# Patient Record
Sex: FEMALE | Race: WHITE | Employment: OTHER | URBAN - METROPOLITAN AREA
[De-identification: names, ages, dates, MRNs, and addresses within clinical notes are randomized per-mention and may not be internally consistent; named-entity substitution may affect disease eponyms.]

---

## 2017-04-28 ENCOUNTER — TRANSFERRED RECORDS (OUTPATIENT)
Dept: HEALTH INFORMATION MANAGEMENT | Facility: CLINIC | Age: 72
End: 2017-04-28

## 2017-09-12 ENCOUNTER — TRANSFERRED RECORDS (OUTPATIENT)
Dept: HEALTH INFORMATION MANAGEMENT | Facility: CLINIC | Age: 72
End: 2017-09-12

## 2018-02-17 ENCOUNTER — TRANSFERRED RECORDS (OUTPATIENT)
Dept: HEALTH INFORMATION MANAGEMENT | Facility: CLINIC | Age: 73
End: 2018-02-17

## 2018-10-03 ENCOUNTER — TRANSFERRED RECORDS (OUTPATIENT)
Dept: HEALTH INFORMATION MANAGEMENT | Facility: CLINIC | Age: 73
End: 2018-10-03

## 2018-11-29 ENCOUNTER — TRANSFERRED RECORDS (OUTPATIENT)
Dept: HEALTH INFORMATION MANAGEMENT | Facility: CLINIC | Age: 73
End: 2018-11-29

## 2018-12-11 ENCOUNTER — HOSPITAL ENCOUNTER (OUTPATIENT)
Dept: CARDIOLOGY | Facility: CLINIC | Age: 73
Discharge: HOME | End: 2018-12-11
Attending: INTERNAL MEDICINE | Admitting: INTERNAL MEDICINE
Payer: MEDICARE

## 2018-12-11 DIAGNOSIS — I27.20 PULMONARY HYPERTENSION (H): ICD-10-CM

## 2018-12-11 DIAGNOSIS — J84.10 PULMONARY FIBROSIS (H): ICD-10-CM

## 2018-12-11 DIAGNOSIS — M35.9 SYSTEMIC INVOLVEMENT OF CONNECTIVE TISSUE (H): ICD-10-CM

## 2018-12-11 PROCEDURE — 25500064 ZZH RX 255 OP 636: Performed by: INTERNAL MEDICINE

## 2018-12-11 PROCEDURE — 93306 TTE W/DOPPLER COMPLETE: CPT

## 2018-12-11 PROCEDURE — 93306 TTE W/DOPPLER COMPLETE: CPT | Mod: 26 | Performed by: INTERNAL MEDICINE

## 2018-12-11 RX ADMIN — HUMAN ALBUMIN MICROSPHERES AND PERFLUTREN 3 ML: 10; .22 INJECTION, SOLUTION INTRAVENOUS at 09:15

## 2018-12-14 ENCOUNTER — TRANSFERRED RECORDS (OUTPATIENT)
Dept: HEALTH INFORMATION MANAGEMENT | Facility: CLINIC | Age: 73
End: 2018-12-14

## 2018-12-17 ENCOUNTER — TRANSFERRED RECORDS (OUTPATIENT)
Dept: HEALTH INFORMATION MANAGEMENT | Facility: CLINIC | Age: 73
End: 2018-12-17

## 2018-12-26 DIAGNOSIS — J84.9 ILD (INTERSTITIAL LUNG DISEASE) (H): Primary | ICD-10-CM

## 2019-02-27 ENCOUNTER — DOCUMENTATION ONLY (OUTPATIENT)
Dept: CARE COORDINATION | Facility: CLINIC | Age: 74
End: 2019-02-27

## 2019-04-16 PROCEDURE — 00000346 ZZHCL STATISTIC REVIEW OUTSIDE SLIDES TC 88321: Performed by: INTERNAL MEDICINE

## 2019-04-19 ENCOUNTER — OFFICE VISIT (OUTPATIENT)
Dept: PULMONOLOGY | Facility: CLINIC | Age: 74
End: 2019-04-19
Attending: INTERNAL MEDICINE
Payer: MEDICARE

## 2019-04-19 ENCOUNTER — ANCILLARY PROCEDURE (OUTPATIENT)
Dept: CT IMAGING | Facility: CLINIC | Age: 74
End: 2019-04-19
Attending: INTERNAL MEDICINE
Payer: MEDICARE

## 2019-04-19 ENCOUNTER — APPOINTMENT (OUTPATIENT)
Dept: LAB | Facility: CLINIC | Age: 74
End: 2019-04-19
Payer: MEDICARE

## 2019-04-19 VITALS
HEART RATE: 93 BPM | DIASTOLIC BLOOD PRESSURE: 80 MMHG | RESPIRATION RATE: 18 BRPM | BODY MASS INDEX: 22.57 KG/M2 | HEIGHT: 67 IN | WEIGHT: 143.8 LBS | SYSTOLIC BLOOD PRESSURE: 131 MMHG | OXYGEN SATURATION: 96 %

## 2019-04-19 DIAGNOSIS — J84.9 ILD (INTERSTITIAL LUNG DISEASE) (H): ICD-10-CM

## 2019-04-19 DIAGNOSIS — J84.9 ILD (INTERSTITIAL LUNG DISEASE) (H): Primary | ICD-10-CM

## 2019-04-19 LAB
6 MIN WALK (FT): 1095 FT
6 MIN WALK (M): 334 M

## 2019-04-19 PROCEDURE — 86606 ASPERGILLUS ANTIBODY: CPT | Performed by: INTERNAL MEDICINE

## 2019-04-19 PROCEDURE — 86331 IMMUNODIFFUSION OUCHTERLONY: CPT | Performed by: INTERNAL MEDICINE

## 2019-04-19 PROCEDURE — 36415 COLL VENOUS BLD VENIPUNCTURE: CPT | Performed by: INTERNAL MEDICINE

## 2019-04-19 PROCEDURE — G0463 HOSPITAL OUTPT CLINIC VISIT: HCPCS | Mod: ZF

## 2019-04-19 RX ORDER — METHYLPREDNISOLONE 4 MG
TABLET, DOSE PACK ORAL
Refills: 0 | COMMUNITY
Start: 2019-03-06 | End: 2019-09-06

## 2019-04-19 RX ORDER — PANTOPRAZOLE SODIUM 40 MG/1
TABLET, DELAYED RELEASE ORAL
COMMUNITY
Start: 2019-03-06

## 2019-04-19 RX ORDER — TRAZODONE HYDROCHLORIDE 50 MG/1
TABLET, FILM COATED ORAL
Refills: 2 | COMMUNITY
Start: 2019-02-25

## 2019-04-19 RX ORDER — MONTELUKAST SODIUM 10 MG/1
TABLET ORAL
Refills: 2 | COMMUNITY
Start: 2019-02-25

## 2019-04-19 RX ORDER — LORATADINE 10 MG/1
10 TABLET ORAL DAILY
COMMUNITY

## 2019-04-19 RX ORDER — FLUTICASONE PROPIONATE 50 MCG
SPRAY, SUSPENSION (ML) NASAL
Refills: 5 | COMMUNITY
Start: 2018-09-07 | End: 2020-04-17

## 2019-04-19 RX ORDER — NIFEDIPINE 30 MG/1
TABLET, EXTENDED RELEASE ORAL
Refills: 4 | COMMUNITY
Start: 2019-02-25

## 2019-04-19 RX ORDER — ALBUTEROL SULFATE 90 UG/1
AEROSOL, METERED RESPIRATORY (INHALATION)
Refills: 1 | COMMUNITY
Start: 2018-11-01

## 2019-04-19 RX ORDER — LEVOFLOXACIN 500 MG/1
TABLET, FILM COATED ORAL
Refills: 0 | COMMUNITY
Start: 2019-03-06 | End: 2019-09-06

## 2019-04-19 RX ORDER — ACETAMINOPHEN 160 MG/5ML
300 SUSPENSION, ORAL (FINAL DOSE FORM) ORAL 3 TIMES DAILY
COMMUNITY

## 2019-04-19 RX ORDER — DOCUSATE SODIUM 100 MG/1
100 CAPSULE, LIQUID FILLED ORAL 2 TIMES DAILY
COMMUNITY

## 2019-04-19 SDOH — HEALTH STABILITY: MENTAL HEALTH: HOW OFTEN DO YOU HAVE A DRINK CONTAINING ALCOHOL?: NEVER

## 2019-04-19 ASSESSMENT — MIFFLIN-ST. JEOR: SCORE: 1174.9

## 2019-04-19 ASSESSMENT — PAIN SCALES - GENERAL: PAINLEVEL: NO PAIN (0)

## 2019-04-19 NOTE — PATIENT INSTRUCTIONS
You were seen in interstitial lung disease clinic today for your lung disease. We would like to do blood work and discuss your case at our multidisciplinary conference on 4/22/19. Currently, our leading diagnosis is hypersensitivity pneumonitis vs idiopathic pulmonary fibrosis, but we would like to review all available tests to confirm. We will call you next week in regards to what our consensus is and what our recommendations are.

## 2019-04-19 NOTE — LETTER
4/19/2019       RE: Edda Sterling  [address]  [facility] [address]     Dear Colleague,    Thank you for referring your patient, Edda Sterling, to the [facility] at [facility]. Please see a copy of my visit note below.    Interstitial Lung Disease Clinic Note    A/P:  74F seen for one time visit for interstitial lung disease. Her 2010 lung biopsy in [place] showing nonnecrotizing granulomas is c/w hypersensitivity pneumonitis. Her HRCT today w/ peripheral predominant reticulations and honeycombing may represent chronic HP or idiopathic pulmonary fibrosis. Her diagnosis is not completely clear at this time. Leading diagnoses are chronic HP or IPF. Prior testing at [facility] was unrevealing for exposures, but she does have a history that is concerning. We will discuss her case at ILD conference 4/22/19 with hopes of coming up with a consensus diagnosis. We are also sending a HP panel today. If this is chronic HP, we likely will recommend prednisone with mycophenolate. If this is IPF, we would trial antifibrotic therapy (pirfenidone or nintedanib). In the meantime, she should continue Advair, which can help reduce inflammation in HP. We will call her following ILD conference to let her know our recommendations. In the future, she would like to continue to follow at [facility], which is reasonable.       I saw and evaluated patient with Fellow.  Case discussed - agree with note.  I reviewed PFT: mod restriction with mod diffusion defect.  I reviewed chest CT: peripheral and basilar-predominant fibrosis.  Radiology report indicates diffuse micronodules, which makes IPF unlikely and chronic HP more likely.  Will discuss in ILD conference.    SIVAN ARMAS M.D.        History:  74F seen as a new patient for ILD. She was diagnosed with ILD in 2010. She had an open lung bx showing chronic interstitial pneumonia w/ nonnecrotizing  "granulomas. She was given a diagnosis of HP, had unremarkable lab work-up. In 2011, she had a L pleural effusion and CT w/ UIP pattern. Found to have + ADAM and given diagnosis of possible CTD. She has been seen by rheumatology, most recently 11/2018. Ab w/u negative, ADAM neg. Told she does not appear to have CTD, no follow-up needed. She has previously followed w/ [facility] for ILD. Has not been on therapy, aside from Advair - unclear if this helps. Has TTE in the past c/w pHTN, RVSP 32.5.    Hospitalized in [place] this winter w/ difficulty swallowing. Diagnosed with pyloric stricture, which was dilated. Increased PPI, which helps.     Today, feels at baseline. Has NORTON w/ minimal activity, can go up 1 flight of stairs. No SOB at rest. Has daily cough, dry. Cough was worse this winter, which she relates to GERD. Has lost ~10-12lbs the past year, relates to aforementioned eating difficulties. Has allergy issues w/ sinus congestion, rhinorrhea, post-nasal gtt, unchanged for years. On Flonase, Singulair, Claritin. Has chronic joint pain in knees. Chronic dry eyes and dry mouth. Denies F/C, night sweats, dysphagia, CP, orthopnea, PND, LE edema, N/V/D, new joint aches or rashes.     10 point review of systems negative, aside from that mentioned in HPI.    /80   Pulse 93   Resp 18   Ht 1.702 m (5' 7\")   Wt 65.2 kg (143 lb 12.8 oz)   SpO2 96%   BMI 22.52 kg/m      Well-appearing, NAD  OP clear  RRR  Bibasilar crackles  Abd NTND  No edema    Labs:  Personally reviewed  ACE, C4, C3, CRP, ESR normal  Centromere, chromatin, dsDNA, RNP, Scl-70, Dangelo, SSA, SSB, ssDNA Ab neg    Imaging/Studies: Personally reviewed  CT Chest (4/19/19) -    FVC FEV1 TLC DLCO   4/2014 () 2.32L 76%      9/2015 () 2.35L 71%      4/2017 ([facility]) 2.3L 71% 1.8L 74%     9/2017 ([facility]) 2.3L 77% 1.9L 83%  11.9 56%   12/2018 ([facility]) 2.17L 68% 1.73L 72%  11.1 57%   4/2019 [facility] 2.00L 67% 1.72L 76% 3.58L 65% 12.81 60%     6MWT done today w/ reduced " distance w/o e/o hypoxia or desaturation w/o supplemental O2    History reviewed. No pertinent past medical history.  History reviewed. No pertinent surgical history.  History reviewed. No pertinent family history.  Social History     Socioeconomic History     Marital status:      Spouse name: Not on file     Number of children: Not on file     Years of education: Not on file     Highest education level: Not on file   Occupational History     Not on file   Social Needs     Financial resource strain: Not on file     Food insecurity:     Worry: Not on file     Inability: Not on file     Transportation needs:     Medical: Not on file     Non-medical: Not on file   Tobacco Use     Smoking status: Never Smoker     Smokeless tobacco: Never Used   Substance and Sexual Activity     Alcohol use: Not Currently     Frequency: Never     Drug use: Never     Sexual activity: Not on file   Lifestyle     Physical activity:     Days per week: Not on file     Minutes per session: Not on file     Stress: Not on file   Relationships     Social connections:     Talks on phone: Not on file     Gets together: Not on file     Attends Anabaptism service: Not on file     Active member of club or organization: Not on file     Attends meetings of clubs or organizations: Not on file     Relationship status: Not on file     Intimate partner violence:     Fear of current or ex partner: Not on file     Emotionally abused: Not on file     Physically abused: Not on file     Forced sexual activity: Not on file   Other Topics Concern     Parent/sibling w/ CABG, MI or angioplasty before 65F 55M? Not Asked   Social History Narrative     Not on file   Lives in [place]. Originally from [place], also lived in [place]. 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 Prior dogs, no current pets. No birds. No instruments. No other chemical/dust exposures. No hot tubs.  \[facility]\"" she has been exposed to mold frequently, including several floods in her office. Never smoker. No illicits.     Clark Arias MD  Pulmonary and Critical Care Medicine  [phone]      Interstitial Lung Disease Clinic Note    A/P:  74F seen for one time visit for interstitial lung disease. Her 2010 lung biopsy in [place] showing nonnecrotizing granulomas is c/w hypersensitivity pneumonitis. Her HRCT today w/ peripheral predominant reticulations and honeycombing may represent chronic HP or idiopathic pulmonary fibrosis. Her diagnosis is not completely clear at this time. Leading diagnoses are chronic HP or IPF. Prior testing at [facility] was unrevealing for exposures, but she does have a history that is concerning. We will discuss her case at ILD conference 4/22/19 with hopes of coming up with a consensus diagnosis. We are also sending a HP panel today. If this is chronic HP, we likely will recommend prednisone with mycophenolate. If this is IPF, we would trial antifibrotic therapy (pirfenidone or nintedanib). In the meantime, she should continue Advair, which can help reduce inflammation in HP. We will call her following ILD conference to let her know our recommendations. In the future, she would like to continue to follow at [facility], which is reasonable.       I saw and evaluated patient with Fellow.  Case discussed - agree with note.  I reviewed PFT: mod restriction with mod diffusion defect.  I reviewed chest CT: peripheral and basilar-predominant fibrosis.  Radiology report indicates diffuse microdules, which makes IPF unlikely and chronic HP more likely.  Will discuss in ILD conference.    Addendum:  I reviewed chest CT in ILD conference with Dr. Browning - Diffuse micronodules, basilar reticulation, possible honeycombing, traction bronchiectasis with minimal air trapping.  Not UIP due to micronodules.  I reivewed lung bx with Dr. Eckert - diffuse lymphocytic infiltrate, no honeycombing, a  few granulomas; looks like NSIP vs HP.  Will treat with prednisone and MMF.  Start prednisone 40 mg daily for 1 month, then 30 mg daily for 1 month, then 20 mg daily for 1 month, then 15 mg daily for 1 month, then 10 mg daily for 1 month, then 5 mg daily for 1 month, then stop.  Start  mg twice daily for 2 weeks, then 500 mg AM and 1000 mg PM for 2 weeks, then 1000 mg twice daily.  Check CBC with plt and dfif and CMP baseline before starting MMF, after 1 month, then every 3 months.  She is welcome to come back to clinic if needed. will follow up with Dr. Saunders.    SIVAN ARMAS M.D.        History:  74F seen as a new patient for ILD. She was diagnosed with ILD in 2010. She had an open lung bx showing chronic interstitial pneumonia w/ nonnecrotizing granulomas. She was given a diagnosis of HP, had unremarkable lab work-up. In 2011, she had a L pleural effusion and CT w/ UIP pattern. Found to have + ADAM and given diagnosis of possible CTD. She has been seen by rheumatology, most recently 11/2018. Ab w/u negative, ADAM neg. Told she does not appear to have CTD, no follow-up needed. She has previously followed w/ [facility] for ILD. Has not been on therapy, aside from Advair - unclear if this helps. Has TTE in the past c/w pHTN, RVSP 32.5.    Hospitalized in [place] this winter w/ difficulty swallowing. Diagnosed with pyloric stricture, which was dilated. Increased PPI, which helps.     Today, feels at baseline. Has NORTON w/ minimal activity, can go up 1 flight of stairs. No SOB at rest. Has daily cough, dry. Cough was worse this winter, which she relates to GERD. Has lost ~10-12lbs the past year, relates to aforementioned eating difficulties. Has allergy issues w/ sinus congestion, rhinorrhea, post-nasal gtt, unchanged for years. On Flonase, Singulair, Claritin. Has chronic joint pain in knees. Chronic dry eyes and dry mouth. Denies F/C, night sweats, dysphagia, CP, orthopnea, PND, LE edema, N/V/D, new joint aches or  "rashes.     10 point review of systems negative, aside from that mentioned in HPI.    /80   Pulse 93   Resp 18   Ht 1.702 m (5' 7\")   Wt 65.2 kg (143 lb 12.8 oz)   SpO2 96%   BMI 22.52 kg/m      Well-appearing, NAD  OP clear  RRR  Bibasilar crackles  Abd NTND  No edema    Labs:  Personally reviewed  ACE, C4, C3, CRP, ESR normal  Centromere, chromatin, dsDNA, RNP, Scl-70, Dangelo, SSA, SSB, ssDNA Ab neg    Imaging/Studies: Personally reviewed  CT Chest (4/19/19) -    FVC FEV1 TLC DLCO   4/2014 () 2.32L 76%      9/2015 () 2.35L 71%      4/2017 () 2.3L 71% 1.8L 74%     9/2017 () 2.3L 77% 1.9L 83%  11.9 56%   12/2018 ([facility]) 2.17L 68% 1.73L 72%  11.1 57%   4/2019 [facility] 2.00L 67% 1.72L 76% 3.58L 65% 12.81 60%     6MWT done today w/ reduced distance w/o e/o hypoxia or desaturation w/o supplemental O2    History reviewed. No pertinent past medical history.  History reviewed. No pertinent surgical history.  History reviewed. No pertinent family history.  Social History     Socioeconomic History     Marital status:      Spouse name: Not on file     Number of children: Not on file     Years of education: Not on file     Highest education level: Not on file   Occupational History     Not on file   Social Needs     Financial resource strain: Not on file     Food insecurity:     Worry: Not on file     Inability: Not on file     Transportation needs:     Medical: Not on file     Non-medical: Not on file   Tobacco Use     Smoking status: Never Smoker     Smokeless tobacco: Never Used   Substance and Sexual Activity     Alcohol use: Not Currently     Frequency: Never     Drug use: Never     Sexual activity: Not on file   Lifestyle     Physical activity:     Days per week: Not on file     Minutes per session: Not on file     Stress: Not on file   Relationships     Social connections:     Talks on phone: Not on file     Gets together: Not on file     Attends Denominational service: Not on file     Active member of " club or organization: Not on file     Attends meetings of clubs or organizations: Not on file     Relationship status: Not on file     Intimate partner violence:     Fear of current or ex partner: Not on file     Emotionally abused: Not on file     Physically abused: Not on file     Forced sexual activity: Not on file   Other Topics Concern     Parent/sibling w/ CABG, MI or angioplasty before 65F 55M? Not Asked   Social History Narrative     Not on file   Lives in [place]. Originally from [place], also lived in [place]. 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 Prior dogs, no current pets. No birds. No instruments. No other chemical/dust exposures. No hot tubs. States she has been exposed to mold frequently, including several floods in her office. Never smoker. No illicits.     Clark Arias MD  Pulmonary and Critical Care Medicine  [phone]      Again, thank you for allowing me to participate in the care of your patient.      Sincerely,    Kitty Olivares MD

## 2019-04-19 NOTE — PROGRESS NOTES
Interstitial Lung Disease Clinic Note    A/P:  74F seen for one time visit for interstitial lung disease. Her 2010 lung biopsy in [place] showing nonnecrotizing granulomas is c/w hypersensitivity pneumonitis. Her HRCT today w/ peripheral predominant reticulations and honeycombing may represent chronic HP or idiopathic pulmonary fibrosis. Her diagnosis is not completely clear at this time. Leading diagnoses are chronic HP or IPF. Prior testing at [facility] was unrevealing for exposures, but she does have a history that is concerning. We will discuss her case at ILD conference 4/22/19 with hopes of coming up with a consensus diagnosis. We are also sending a HP panel today. If this is chronic HP, we likely will recommend prednisone with mycophenolate. If this is IPF, we would trial antifibrotic therapy (pirfenidone or nintedanib). In the meantime, she should continue Advair, which can help reduce inflammation in HP. We will call her following ILD conference to let her know our recommendations. In the future, she would like to continue to follow at [facility], which is reasonable.       I saw and evaluated patient with Fellow.  Case discussed - agree with note.  I reviewed PFT: mod restriction with mod diffusion defect.  I reviewed chest CT: peripheral and basilar-predominant fibrosis.  Radiology report indicates diffuse microdules, which makes IPF unlikely and chronic HP more likely.  Will discuss in ILD conference.    Addendum:  I reviewed chest CT in ILD conference with Dr. Browning - Diffuse micronodules, basilar reticulation, possible honeycombing, traction bronchiectasis with minimal air trapping.  Not UIP due to micronodules.  I reivewed lung bx with Dr. Eckert - diffuse lymphocytic infiltrate, no honeycombing, a few granulomas; looks like NSIP vs HP.  Will treat with prednisone and MMF.  Start prednisone 40 mg daily for 1 month, then 30 mg daily for 1 month, then 20 mg daily for 1 month, then 15 mg  "daily for 1 month, then 10 mg daily for 1 month, then 5 mg daily for 1 month, then stop.  Start  mg twice daily for 2 weeks, then 500 mg AM and 1000 mg PM for 2 weeks, then 1000 mg twice daily.  Check CBC with plt and dfif and CMP baseline before starting MMF, after 1 month, then every 3 months.  She is welcome to come back to clinic if needed. will follow up with Dr. Saunders.    SIVAN ARMAS M.D.        History:  74F seen as a new patient for ILD. She was diagnosed with ILD in 2010. She had an open lung bx showing chronic interstitial pneumonia w/ nonnecrotizing granulomas. She was given a diagnosis of HP, had unremarkable lab work-up. In 2011, she had a L pleural effusion and CT w/ UIP pattern. Found to have + ADAM and given diagnosis of possible CTD. She has been seen by rheumatology, most recently 11/2018. Ab w/u negative, ADAM neg. Told she does not appear to have CTD, no follow-up needed. She has previously followed w/ [facility] for ILD. Has not been on therapy, aside from Advair - unclear if this helps. Has TTE in the past c/w pHTN, RVSP 32.5.    Hospitalized in [place] this winter w/ difficulty swallowing. Diagnosed with pyloric stricture, which was dilated. Increased PPI, which helps.     Today, feels at baseline. Has NORTON w/ minimal activity, can go up 1 flight of stairs. No SOB at rest. Has daily cough, dry. Cough was worse this winter, which she relates to GERD. Has lost ~10-12lbs the past year, relates to aforementioned eating difficulties. Has allergy issues w/ sinus congestion, rhinorrhea, post-nasal gtt, unchanged for years. On Flonase, Singulair, Claritin. Has chronic joint pain in knees. Chronic dry eyes and dry mouth. Denies F/C, night sweats, dysphagia, CP, orthopnea, PND, LE edema, N/V/D, new joint aches or rashes.     10 point review of systems negative, aside from that mentioned in HPI.    /80   Pulse 93   Resp 18   Ht 1.702 m (5' 7\")   Wt 65.2 kg (143 lb 12.8 oz)   SpO2 96%   BMI " 22.52 kg/m     Well-appearing, NAD  HEENT: OP clear  Cardiac: RRR  Lungs: Bibasilar crackles  Abd NTND  Musculoskeletal: No edema  Skin: no rash on limited exam  Psych: normal mood and affect.  Neuro: normal gait.      Labs:  Personally reviewed  ACE, C4, C3, CRP, ESR normal  Centromere, chromatin, dsDNA, RNP, Scl-70, Dangelo, SSA, SSB, ssDNA Ab neg    Imaging/Studies: Personally reviewed  CT Chest (4/19/19) -    FVC FEV1 TLC DLCO   4/2014 () 2.32L 76%      9/2015 ([facility]) 2.35L 71%      4/2017 ([facility]) 2.3L 71% 1.8L 74%     9/2017 ([facility]) 2.3L 77% 1.9L 83%  11.9 56%   12/2018 ([facility]) 2.17L 68% 1.73L 72%  11.1 57%   4/2019 [facility] 2.00L 67% 1.72L 76% 3.58L 65% 12.81 60%     6MWT done today w/ reduced distance w/o e/o hypoxia or desaturation w/o supplemental O2    History reviewed. No pertinent past medical history.  History reviewed. No pertinent surgical history.  History reviewed. No pertinent family history.  Social History     Socioeconomic History     Marital status:      Spouse name: Not on file     Number of children: Not on file     Years of education: Not on file     Highest education level: Not on file   Occupational History     Not on file   Social Needs     Financial resource strain: Not on file     Food insecurity:     Worry: Not on file     Inability: Not on file     Transportation needs:     Medical: Not on file     Non-medical: Not on file   Tobacco Use     Smoking status: Never Smoker     Smokeless tobacco: Never Used   Substance and Sexual Activity     Alcohol use: Not Currently     Frequency: Never     Drug use: Never     Sexual activity: Not on file   Lifestyle     Physical activity:     Days per week: Not on file     Minutes per session: Not on file     Stress: Not on file   Relationships     Social connections:     Talks on phone: Not on file     Gets together: Not on file     Attends Hinduism service: Not on file     Active member of club or organization: Not on file     Attends meetings of clubs  or organizations: Not on file     Relationship status: Not on file     Intimate partner violence:     Fear of current or ex partner: Not on file     Emotionally abused: Not on file     Physically abused: Not on file     Forced sexual activity: Not on file   Other Topics Concern     Parent/sibling w/ CABG, MI or angioplasty before 65F 55M? Not Asked   Social History Narrative     Not on file   Lives in [place]. Originally from [place], also lived in [place]. 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 Prior dogs, no current pets. No birds. No instruments. No other chemical/dust exposures. No hot tubs. States she has been exposed to mold frequently, including several floods in her office. Never smoker. No illicits.     Clark Arias MD  Pulmonary and Critical Care Medicine  [phone]

## 2019-04-22 LAB
COPATH REPORT: NORMAL
DLCOUNC-%PRED-PRE: 60 %
DLCOUNC-PRE: 12.81 ML/MIN/MMHG
DLCOUNC-PRED: 21.04 ML/MIN/MMHG
ERV-%PRED-PRE: 88 %
ERV-PRE: 0.75 L
ERV-PRED: 0.85 L
EXPTIME-PRE: 5.31 SEC
FEF2575-%PRED-PRE: 110 %
FEF2575-PRE: 1.97 L/SEC
FEF2575-PRED: 1.78 L/SEC
FEFMAX-%PRED-PRE: 104 %
FEFMAX-PRE: 5.87 L/SEC
FEFMAX-PRED: 5.63 L/SEC
FEV1-%PRED-PRE: 76 %
FEV1-PRE: 1.72 L
FEV1FEV6-PRE: 86 %
FEV1FEV6-PRED: 78 %
FEV1FVC-PRE: 86 %
FEV1FVC-PRED: 77 %
FEV1SVC-PRE: 94 %
FEV1SVC-PRED: 68 %
FIFMAX-PRE: 4.33 L/SEC
FRCPLETH-%PRED-PRE: 86 %
FRCPLETH-PRE: 2.49 L
FRCPLETH-PRED: 2.89 L
FVC-%PRED-PRE: 67 %
FVC-PRE: 2 L
FVC-PRED: 2.95 L
IC-%PRED-PRE: 43 %
IC-PRE: 1.09 L
IC-PRED: 2.47 L
RVPLETH-%PRED-PRE: 75 %
RVPLETH-PRE: 1.74 L
RVPLETH-PRED: 2.3 L
TLCPLETH-%PRED-PRE: 65 %
TLCPLETH-PRE: 3.58 L
TLCPLETH-PRED: 5.44 L
VA-%PRED-PRE: 59 %
VA-PRE: 3.14 L
VC-%PRED-PRE: 55 %
VC-PRE: 1.84 L
VC-PRED: 3.32 L

## 2019-04-23 LAB
A FLAVUS AB SER QL ID: NORMAL
A FUMIGATUS1 AB SER QL ID: NORMAL
A FUMIGATUS2 AB SER QL: NORMAL
A FUMIGATUS3 AB SER QL ID: NORMAL
A FUMIGATUS6 AB SER QL ID: NORMAL
A PULLULANS AB SER QL ID: NORMAL
LACEYELLA SACCHARI AB SER QL: NORMAL
PIGEON DROP IGE QN: NORMAL
S RECTIVIRGULA AB SER QL ID: NORMAL
S VIRIDIS AB SER QL: NORMAL
T CANDIDUS AB SER QL: NORMAL
T VULGARIS AB SER QL ID: NORMAL

## 2019-04-26 ENCOUNTER — TELEPHONE (OUTPATIENT)
Dept: PULMONOLOGY | Facility: CLINIC | Age: 74
End: 2019-04-26

## 2019-04-26 NOTE — TELEPHONE ENCOUNTER
----- Message from Kitty Olivares MD sent at 4/25/2019 11:48 AM CDT -----  Regarding: follow up  Please call her with recommendations: If she decides to follow with us, that's fine since the medications are new and require monitoring.    See my addendum below.  Thanks.      Addendum:  I reviewed chest CT in ILD conference with Dr. Browning - Diffuse micronodules, basilar reticulation, possible honeycombing, traction bronchiectasis with minimal air trapping.  Not UIP due to micronodules.  I reivewed lung bx with Dr. Eckert - diffuse lymphocytic infiltrate, no honeycombing, a few granulomas; looks like NSIP vs HP.  Will treat with prednisone and MMF, which treats both NSIP or HP.  Start prednisone 40 mg daily for 1 month, then 30 mg daily for 1 month, then 20 mg daily for 1 month, then 15 mg daily for 1 month, then 10 mg daily for 1 month, then 5 mg daily for 1 month, then stop.  Start  mg twice daily for 2 weeks, then 500 mg AM and 1000 mg PM for 2 weeks, then 1000 mg twice daily.  Check CBC with plt and dfif and CMP baseline before starting MMF, after 1 month, then every 3 months.  She is welcome to come back to clinic if needed. will follow up with Dr. Saunders.

## 2019-04-26 NOTE — TELEPHONE ENCOUNTER
Spoke to patient, informed her of ILD discussion and Dr Olivares recommendation on plan. Patient agreed with plan, wants to continue with Dr Saunders, Dr Olivares's note sent to his office and spoke with nurse Wilkins, they will contact the patient.

## 2019-07-31 DIAGNOSIS — J84.9 ILD (INTERSTITIAL LUNG DISEASE) (H): Primary | ICD-10-CM

## 2019-09-06 ENCOUNTER — PATIENT OUTREACH (OUTPATIENT)
Dept: PULMONOLOGY | Facility: CLINIC | Age: 74
End: 2019-09-06

## 2019-09-06 ENCOUNTER — OFFICE VISIT (OUTPATIENT)
Dept: PULMONOLOGY | Facility: CLINIC | Age: 74
End: 2019-09-06
Attending: INTERNAL MEDICINE
Payer: MEDICARE

## 2019-09-06 VITALS
DIASTOLIC BLOOD PRESSURE: 77 MMHG | OXYGEN SATURATION: 95 % | RESPIRATION RATE: 17 BRPM | SYSTOLIC BLOOD PRESSURE: 148 MMHG | BODY MASS INDEX: 21.97 KG/M2 | WEIGHT: 140 LBS | HEART RATE: 78 BPM | HEIGHT: 67 IN

## 2019-09-06 DIAGNOSIS — J84.9 ILD (INTERSTITIAL LUNG DISEASE) (H): ICD-10-CM

## 2019-09-06 PROCEDURE — G0463 HOSPITAL OUTPT CLINIC VISIT: HCPCS | Mod: ZF

## 2019-09-06 RX ORDER — MYCOPHENOLATE MOFETIL 500 MG/1
TABLET ORAL
Qty: 120 TABLET | Refills: 11 | Status: SHIPPED | OUTPATIENT
Start: 2019-09-06 | End: 2019-10-14

## 2019-09-06 RX ORDER — ATOVAQUONE 750 MG/5ML
1500 SUSPENSION ORAL DAILY
Qty: 300 ML | Refills: 11 | Status: SHIPPED | OUTPATIENT
Start: 2019-09-06 | End: 2019-10-14

## 2019-09-06 RX ORDER — PREDNISONE 10 MG/1
TABLET ORAL
Qty: 100 TABLET | Refills: 0 | Status: SHIPPED | OUTPATIENT
Start: 2019-09-06 | End: 2020-01-16

## 2019-09-06 ASSESSMENT — PAIN SCALES - GENERAL: PAINLEVEL: NO PAIN (0)

## 2019-09-06 ASSESSMENT — MIFFLIN-ST. JEOR: SCORE: 1157.67

## 2019-09-06 NOTE — LETTER
9/6/2019       RE: Edda Sterling  [address]  [facility] [address]     Dear Colleague,    Thank you for referring your patient, Edda Sterling, to the [facility] at [facility]. Please see a copy of my visit note below.    [facility]    Reason for Visit  Edda Sterling is a 74 year old year old female who is being seen for RECHECK (NSIP)    HPI    Ms. Sterling is a 74-year-old with interstitial lung disease who is here for follow-up. She has had ILD since at least 2010 and was diagnosed with hypersensitivity pneumonitis in [place].  I first saw her 4 months ago, and review at ILD multidisciplinary conference was that her chest CT scan and prior lung biopsy were consistent with chronic hypersensitivity pneumonitis.  For convenience sake, she wanted to follow-up with Dr. Saunders at [facility].  However, he recommended that she follow-up here to initiate and follow her medications for treatment of chronic HP.  She had a positive ADAM in the past in [place], however most recent ADAM in 2018 was negative, and Dr. Bernal in rheumatology felt she did not have a connective tissue disease.    She has had 4 endoscopies this year for pyloric stricture with attempts to dilate.  She takes Protonix daily for GERD.  She does not eat bedtime snacks.  She endorses dyspnea on exertion, for example when she walks up a flight of stairs or walks uphill.  She thinks it is unchanged over the past year.  She denies paroxysmal nocturnal dyspnea, new skin rashes, or new joint pains.  She does endorse sicca symptoms which she has had for many years.  She also endorses occasional cough.  She does not exercise.  At most, she walks for maybe 20 minutes.  She did pulmonary rehab in [place] 4 to 5 years ago.  She lives in [place] and coming to [place] is inconvenient, and she would prefer to follow-up somewhere  closer to her home if possible.        Current Outpatient Medications   Medication     ADVAIR DISKUS 250-50 MCG/DOSE inhaler     Alum & Mag Hydroxide-Simeth (MYLANTA PO)     atovaquone (MEPRON) 750 MG/5ML suspension     cholecalciferol (VITAMIN D3) 5000 units TABS tablet     docusate sodium (COLACE) 100 MG capsule     fluticasone (FLONASE) 50 MCG/ACT nasal spray     loratadine (CLARITIN) 10 MG tablet     montelukast (SINGULAIR) 10 MG tablet     mycophenolate (GENERIC EQUIVALENT) 500 MG tablet     Naproxen Sodium (ALEVE PO)     NIFEdipine ER OSMOTIC (PROCARDIA XL) 30 MG 24 hr tablet     pantoprazole (PROTONIX) 40 MG EC tablet     predniSONE (DELTASONE) 10 MG tablet     PROAIR  (90 Base) MCG/ACT inhaler     st johns wort 300 MG TABS tablet     traZODone (DESYREL) 50 MG tablet     No current facility-administered medications for this visit.      Allergies   Allergen Reactions     Nuts      Peas      Seasonal Allergies      Sulfa Drugs      Past Medical History:   Diagnosis Date     ILD (interstitial lung disease) (H)     Surgical lung bx 2010 poss HP, + ADAM 2011, but repeat neg and no CTD per Rheum Dr. Bernal 2018; ILD clinic 4/2019: CT and lung bx consistent with chronic HP       No past surgical history on file.    Social History     Socioeconomic History     Marital status:      Spouse name: Not on file     Number of children: Not on file     Years of education: Not on file     Highest education level: Not on file   Occupational History     Not on file   Social Needs     Financial resource strain: Not on file     Food insecurity:     Worry: Not on file     Inability: Not on file     Transportation needs:     Medical: Not on file     Non-medical: Not on file   Tobacco Use     Smoking status: Never Smoker     Smokeless tobacco: Never Used   Substance and Sexual Activity     Alcohol use: Not Currently     Frequency: Never     Drug use: Never     Sexual activity: Not on file   Lifestyle     Physical  "activity:     Days per week: Not on file     Minutes per session: Not on file     Stress: Not on file   Relationships     Social connections:     Talks on phone: Not on file     Gets together: Not on file     Attends Islam service: Not on file     Active member of club or organization: Not on file     Attends meetings of clubs or organizations: Not on file     Relationship status: Not on file     Intimate partner violence:     Fear of current or ex partner: Not on file     Emotionally abused: Not on file     Physically abused: Not on file     Forced sexual activity: Not on file   Other Topics Concern     Parent/sibling w/ CABG, MI or angioplasty before 65F 55M? Not Asked   Social History Narrative    Lived in [place] and [place], then moved to [place] 2017.  Retired .  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 Denies exposure to pet birds, hot tubs brass/woodwind instruments.  + exposure to mold in past.       Family History   Problem Relation Age of Onset     LUNG DISEASE No family hx of              ROS Pulmonary  A complete ROS was otherwise negative except as noted in the HPI.    Vitals: BP (!) 148/77   Pulse 78   Resp 17   Ht 1.702 m (5' 7\")   Wt 63.5 kg (140 lb)   SpO2 95%   BMI 21.93 kg/m       Exam:   GENERAL APPEARANCE: Well developed, well nourished, alert, and in no apparent distress.  RESP: good air flow throughout.  Bibasilar inspiraoty crackles. No rhonchi. No wheezes.  CV: Normal S1, S2, regular rhythm, normal rate. No murmur.  No LE edema.   MS: extremities normal. No clubbing. No cyanosis.  SKIN: no rash on limited exam.  NEURO: Mentation intact, speech normal, normal gait and stance.  PSYCH: mentation appears normal. and affect normal/bright.    Results:  Recent Results (from the past 168 hour(s))   General PFT Lab (Please always keep checked)    Collection Time: 09/06/19 10:36 AM   Result Value Ref " Range    FVC-Pred 2.95 L    FVC-Pre 2.11 L    FVC-%Pred-Pre 71 %    FEV1-Pre 1.84 L    FEV1-%Pred-Pre 81 %    FEV1FVC-Pred 77 %    FEV1FVC-Pre 87 %    FEFMax-Pred 5.63 L/sec    FEFMax-Pre 5.64 L/sec    FEFMax-%Pred-Pre 100 %    FEF2575-Pred 1.78 L/sec    FEF2575-Pre 2.25 L/sec    LUI3980-%Pred-Pre 126 %    ExpTime-Pre 4.68 sec    FIFMax-Pre 5.38 L/sec    VC-Pred 3.32 L    VC-Pre 2.01 L    VC-%Pred-Pre 60 %    IC-Pred 2.44 L    IC-Pre 1.03 L    IC-%Pred-Pre 42 %    ERV-Pred 0.88 L    ERV-Pre 0.98 L    ERV-%Pred-Pre 111 %    FEV1FEV6-Pred 78 %    FEV1FEV6-Pre 87 %    FRCPleth-Pred 2.89 L    FRCPleth-Pre 2.74 L    FRCPleth-%Pred-Pre 94 %    RVPleth-Pred 2.30 L    RVPleth-Pre 1.76 L    RVPleth-%Pred-Pre 76 %    TLCPleth-Pred 5.44 L    TLCPleth-Pre 3.77 L    TLCPleth-%Pred-Pre 69 %    DLCOunc-Pred 21.04 ml/min/mmHg    DLCOunc-Pre 13.00 ml/min/mmHg    DLCOunc-%Pred-Pre 61 %    VA-Pre 3.08 L    VA-%Pred-Pre 58 %    FEV1SVC-Pred 68 %    FEV1SVC-Pre 91 %         FVC FEV1 TLC DLCO   4/2014 () 2.32L 76%         9/2015 () 2.35L 71%         4/2017 () 2.3L 71% 1.8L 74%       9/2017 () 2.3L 77% 1.9L 83%   11.9 56%   12/2018 ([facility]) 2.17L 68% 1.73L 72%   11.1 57%   4/2019 [facility] 2.00L 67% 1.72L 76% 3.58L 65% 12.81 60%   9-6-2019 2.11 71% 1.84 81% 3.77 69% 13.00 61%       I reviewed pulmonary function test that was performed today.  This shows mild restriction with a mild diffusion defect.  Lung function is stable although down compared to 2014 outside PFT from [place].    I reviewed results with the patient.      Assessment and plan:     Ms. Sterling is a 74-year-old with interstitial lung disease who is here for follow-up.  She has probable chronic hypersensitivity pneumonitis.    1.  ILD.  For her chronic HP, she has had a drop in lung function since 2014 and some dyspnea on exertion and and cough.  It seems reasonable to try anti-inflammatory and anti-fibrotic therapy for her.  We will start with prednisone and  mycophenolate.  Prednisone 30 mg daily for 2 weeks, 20 mg daily for 2 weeks, 10 mg daily for 4 weeks, then off.  She does not not want to stay on prednisone long-term.  Mycophenolate will start at 500 mg twice daily for 2 weeks, then 500 mg in the morning and 1000 mg in the evening for 2 weeks, then 1000 mg twice daily.  We discussed possible side effects of prednisone and mycophenolate.  We should give this combination 6 to 12 months.  If there is no improvement in symptoms or PFT does not stabilize, then we can stop the mycophenolate.  She is in agreement with this plan.  I also encouraged her to do pulmonary rehab, and she tells me that she will do it if there is a convenient place near [place].  I will have my nurse talk with her about finding a pulmonary rehab program close to her home.  I encouraged her to do at least 30 minutes of activity per day.  She will return in 4 months with PFT.  If we start an out reach clinic in [place], then she will follow-up there as it is more convenient to where she lives.    2.  High risk medication monitoring.  We will get baseline labs then check in 1 month and then every 3 months thereafter to monitor mycophenolate.  She also should get a dermatology check at some point.  She should get a flu vaccine this fall.  She reports that she has had the pneumonia vaccines.  She will need pneumocystis prophylaxis, and because of her sulfa allergy I will use atovaquone 1500 mg once daily.    I spent 45 minutes with the patient, more than 50% spent in counseling and discussing plan of care.            Again, thank you for allowing me to participate in the care of your patient.      Sincerely,    Kitty Olivares MD

## 2019-09-06 NOTE — PROGRESS NOTES
[facility]    Reason for Visit  Edda Sterling is a 74 year old year old female who is being seen for RECHECK (NS)    HPI    Ms. Sterling is a 74-year-old with interstitial lung disease who is here for follow-up. She has had ILD since at least 2010 and was diagnosed with hypersensitivity pneumonitis in [place].  I first saw her 4 months ago, and review at ILD multidisciplinary conference was that her chest CT scan and prior lung biopsy were consistent with chronic hypersensitivity pneumonitis.  For convenience sake, she wanted to follow-up with Dr. Saunders at [facility].  However, he recommended that she follow-up here to initiate and follow her medications for treatment of chronic HP.  She had a positive ADAM in the past in [place], however most recent ADAM in 2018 was negative, and Dr. Bernal in rheumatology felt she did not have a connective tissue disease.    She has had 4 endoscopies this year for pyloric stricture with attempts to dilate.  She takes Protonix daily for GERD.  She does not eat bedtime snacks.  She endorses dyspnea on exertion, for example when she walks up a flight of stairs or walks uphill.  She thinks it is unchanged over the past year.  She denies paroxysmal nocturnal dyspnea, new skin rashes, or new joint pains.  She does endorse sicca symptoms which she has had for many years.  She also endorses occasional cough.  She does not exercise.  At most, she walks for maybe 20 minutes.  She did pulmonary rehab in [place] 4 to 5 years ago.  She lives in [place] and coming to [place] is inconvenient, and she would prefer to follow-up somewhere closer to her home if possible.        Current Outpatient Medications   Medication     ADVAIR DISKUS 250-50 MCG/DOSE inhaler     Alum & Mag Hydroxide-Simeth (MYLANTA PO)     atovaquone (MEPRON) 750 MG/5ML suspension     cholecalciferol (VITAMIN D3) 5000 units TABS tablet     docusate sodium (COLACE) 100  MG capsule     fluticasone (FLONASE) 50 MCG/ACT nasal spray     loratadine (CLARITIN) 10 MG tablet     montelukast (SINGULAIR) 10 MG tablet     mycophenolate (GENERIC EQUIVALENT) 500 MG tablet     Naproxen Sodium (ALEVE PO)     NIFEdipine ER OSMOTIC (PROCARDIA XL) 30 MG 24 hr tablet     pantoprazole (PROTONIX) 40 MG EC tablet     predniSONE (DELTASONE) 10 MG tablet     PROAIR  (90 Base) MCG/ACT inhaler     st johns wort 300 MG TABS tablet     traZODone (DESYREL) 50 MG tablet     No current facility-administered medications for this visit.      Allergies   Allergen Reactions     Nuts      Peas      Seasonal Allergies      Sulfa Drugs      Past Medical History:   Diagnosis Date     ILD (interstitial lung disease) (H)     Surgical lung bx 2010 poss HP, + ADAM 2011, but repeat neg and no CTD per Rheum Dr. Bernal 2018; ILD clinic 4/2019: CT and lung bx consistent with chronic HP       No past surgical history on file.    Social History     Socioeconomic History     Marital status:      Spouse name: Not on file     Number of children: Not on file     Years of education: Not on file     Highest education level: Not on file   Occupational History     Not on file   Social Needs     Financial resource strain: Not on file     Food insecurity:     Worry: Not on file     Inability: Not on file     Transportation needs:     Medical: Not on file     Non-medical: Not on file   Tobacco Use     Smoking status: Never Smoker     Smokeless tobacco: Never Used   Substance and Sexual Activity     Alcohol use: Not Currently     Frequency: Never     Drug use: Never     Sexual activity: Not on file   Lifestyle     Physical activity:     Days per week: Not on file     Minutes per session: Not on file     Stress: Not on file   Relationships     Social connections:     Talks on phone: Not on file     Gets together: Not on file     Attends Nondenominational service: Not on file     Active member of club or organization: Not on file      "Attends meetings of clubs or organizations: Not on file     Relationship status: Not on file     Intimate partner violence:     Fear of current or ex partner: Not on file     Emotionally abused: Not on file     Physically abused: Not on file     Forced sexual activity: Not on file   Other Topics Concern     Parent/sibling w/ CABG, MI or angioplasty before 65F 55M? Not Asked   Social History Narrative    Lived in [place] and [place], then moved to [place] 2017.  Retired .  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 Denies exposure to pet birds, hot tubs brass/woodwind instruments.  + exposure to mold in past.       Family History   Problem Relation Age of Onset     LUNG DISEASE No family hx of              ROS Pulmonary  A complete ROS was otherwise negative except as noted in the HPI.    Vitals: BP (!) 148/77   Pulse 78   Resp 17   Ht 1.702 m (5' 7\")   Wt 63.5 kg (140 lb)   SpO2 95%   BMI 21.93 kg/m      Exam:   GENERAL APPEARANCE: Well developed, well nourished, alert, and in no apparent distress.  RESP: good air flow throughout.  Bibasilar inspiraoty crackles. No rhonchi. No wheezes.  CV: Normal S1, S2, regular rhythm, normal rate. No murmur.  No LE edema.   MS: extremities normal. No clubbing. No cyanosis.  SKIN: no rash on limited exam.  NEURO: Mentation intact, speech normal, normal gait and stance.  PSYCH: mentation appears normal. and affect normal/bright.    Results:  Recent Results (from the past 168 hour(s))   General PFT Lab (Please always keep checked)    Collection Time: 09/06/19 10:36 AM   Result Value Ref Range    FVC-Pred 2.95 L    FVC-Pre 2.11 L    FVC-%Pred-Pre 71 %    FEV1-Pre 1.84 L    FEV1-%Pred-Pre 81 %    FEV1FVC-Pred 77 %    FEV1FVC-Pre 87 %    FEFMax-Pred 5.63 L/sec    FEFMax-Pre 5.64 L/sec    FEFMax-%Pred-Pre 100 %    FEF2575-Pred 1.78 L/sec    FEF2575-Pre 2.25 L/sec    VIF8718-%Pred-Pre 126 %    " ExpTime-Pre 4.68 sec    FIFMax-Pre 5.38 L/sec    VC-Pred 3.32 L    VC-Pre 2.01 L    VC-%Pred-Pre 60 %    IC-Pred 2.44 L    IC-Pre 1.03 L    IC-%Pred-Pre 42 %    ERV-Pred 0.88 L    ERV-Pre 0.98 L    ERV-%Pred-Pre 111 %    FEV1FEV6-Pred 78 %    FEV1FEV6-Pre 87 %    FRCPleth-Pred 2.89 L    FRCPleth-Pre 2.74 L    FRCPleth-%Pred-Pre 94 %    RVPleth-Pred 2.30 L    RVPleth-Pre 1.76 L    RVPleth-%Pred-Pre 76 %    TLCPleth-Pred 5.44 L    TLCPleth-Pre 3.77 L    TLCPleth-%Pred-Pre 69 %    DLCOunc-Pred 21.04 ml/min/mmHg    DLCOunc-Pre 13.00 ml/min/mmHg    DLCOunc-%Pred-Pre 61 %    VA-Pre 3.08 L    VA-%Pred-Pre 58 %    FEV1SVC-Pred 68 %    FEV1SVC-Pre 91 %         FVC FEV1 TLC DLCO   4/2014 ([facility]) 2.32L 76%         9/2015 ([facility]) 2.35L 71%         4/2017 ([facility]) 2.3L 71% 1.8L 74%       9/2017 ([facility]) 2.3L 77% 1.9L 83%   11.9 56%   12/2018 ([facility]) 2.17L 68% 1.73L 72%   11.1 57%   4/2019 [facility] 2.00L 67% 1.72L 76% 3.58L 65% 12.81 60%   9-6-2019 2.11 71% 1.84 81% 3.77 69% 13.00 61%       I reviewed pulmonary function test that was performed today.  This shows mild restriction with a mild diffusion defect.  Lung function is stable although down compared to 2014 outside PFT from [place].    I reviewed results with the patient.      Assessment and plan:     Ms. Sterling is a 74-year-old with interstitial lung disease who is here for follow-up.  She has probable chronic hypersensitivity pneumonitis.    1.  ILD.  For her chronic HP, she has had a drop in lung function since 2014 and some dyspnea on exertion and and cough.  It seems reasonable to try anti-inflammatory and anti-fibrotic therapy for her.  We will start with prednisone and mycophenolate.  Prednisone 30 mg daily for 2 weeks, 20 mg daily for 2 weeks, 10 mg daily for 4 weeks, then off.  She does not not want to stay on prednisone long-term.  Mycophenolate will start at 500 mg twice daily for 2 weeks, then 500 mg in the morning and 1000 mg in the evening for 2 weeks, then 1000 mg twice  daily.  We discussed possible side effects of prednisone and mycophenolate.  We should give this combination 6 to 12 months.  If there is no improvement in symptoms or PFT does not stabilize, then we can stop the mycophenolate.  She is in agreement with this plan.  I also encouraged her to do pulmonary rehab, and she tells me that she will do it if there is a convenient place near [place].  I will have my nurse talk with her about finding a pulmonary rehab program close to her home.  I encouraged her to do at least 30 minutes of activity per day.  She will return in 4 months with PFT.  If we start an out reach clinic in [place], then she will follow-up there as it is more convenient to where she lives.    2.  High risk medication monitoring.  We will get baseline labs then check in 1 month and then every 3 months thereafter to monitor mycophenolate.  She also should get a dermatology check at some point.  She should get a flu vaccine this fall.  She reports that she has had the pneumonia vaccines.  She will need pneumocystis prophylaxis, and because of her sulfa allergy I will use atovaquone 1500 mg once daily.    I spent 45 minutes with the patient, more than 50% spent in counseling and discussing plan of care.

## 2019-09-06 NOTE — NURSING NOTE
Chief Complaint   Patient presents with     RECHECK     NSIP    Medications reviewed and vital signs taken.   Cherry Spain CMA

## 2019-09-06 NOTE — PROGRESS NOTES
Standing orders for CMP and CBC faxed to [facility] in [place].  Fax # [phone].

## 2019-09-08 LAB
DLCOUNC-%PRED-PRE: 61 %
DLCOUNC-PRE: 13 ML/MIN/MMHG
DLCOUNC-PRED: 21.04 ML/MIN/MMHG
ERV-%PRED-PRE: 111 %
ERV-PRE: 0.98 L
ERV-PRED: 0.88 L
EXPTIME-PRE: 4.68 SEC
FEF2575-%PRED-PRE: 126 %
FEF2575-PRE: 2.25 L/SEC
FEF2575-PRED: 1.78 L/SEC
FEFMAX-%PRED-PRE: 100 %
FEFMAX-PRE: 5.64 L/SEC
FEFMAX-PRED: 5.63 L/SEC
FEV1-%PRED-PRE: 81 %
FEV1-PRE: 1.84 L
FEV1FEV6-PRE: 87 %
FEV1FEV6-PRED: 78 %
FEV1FVC-PRE: 87 %
FEV1FVC-PRED: 77 %
FEV1SVC-PRE: 91 %
FEV1SVC-PRED: 68 %
FIFMAX-PRE: 5.38 L/SEC
FRCPLETH-%PRED-PRE: 94 %
FRCPLETH-PRE: 2.74 L
FRCPLETH-PRED: 2.89 L
FVC-%PRED-PRE: 71 %
FVC-PRE: 2.11 L
FVC-PRED: 2.95 L
IC-%PRED-PRE: 42 %
IC-PRE: 1.03 L
IC-PRED: 2.44 L
RVPLETH-%PRED-PRE: 76 %
RVPLETH-PRE: 1.76 L
RVPLETH-PRED: 2.3 L
TLCPLETH-%PRED-PRE: 69 %
TLCPLETH-PRE: 3.77 L
TLCPLETH-PRED: 5.44 L
VA-%PRED-PRE: 58 %
VA-PRE: 3.08 L
VC-%PRED-PRE: 60 %
VC-PRE: 2.01 L
VC-PRED: 3.32 L

## 2019-09-13 ENCOUNTER — TRANSFERRED RECORDS (OUTPATIENT)
Dept: HEALTH INFORMATION MANAGEMENT | Facility: CLINIC | Age: 74
End: 2019-09-13

## 2019-10-14 DIAGNOSIS — J84.9 ILD (INTERSTITIAL LUNG DISEASE) (H): ICD-10-CM

## 2019-10-14 RX ORDER — ATOVAQUONE 750 MG/5ML
1500 SUSPENSION ORAL DAILY
Qty: 300 ML | Refills: 11 | Status: SHIPPED | OUTPATIENT
Start: 2019-10-14 | End: 2020-09-28

## 2019-10-14 RX ORDER — MYCOPHENOLATE MOFETIL 500 MG/1
1000 TABLET ORAL 2 TIMES DAILY
Qty: 120 TABLET | Refills: 11 | Status: SHIPPED | OUTPATIENT
Start: 2019-10-14

## 2019-11-21 ENCOUNTER — HOSPITAL ENCOUNTER (OUTPATIENT)
Dept: CT IMAGING | Facility: CLINIC | Age: 74
Discharge: HOME | End: 2019-11-21
Attending: INTERNAL MEDICINE | Admitting: INTERNAL MEDICINE
Payer: MEDICARE

## 2019-11-21 DIAGNOSIS — K31.1 PYLORIC STENOSIS IN ADULT: ICD-10-CM

## 2019-11-21 DIAGNOSIS — R03.0 ELEVATED BLOOD-PRESSURE READING, WITHOUT DIAGNOSIS OF HYPERTENSION: ICD-10-CM

## 2019-11-21 DIAGNOSIS — R63.0 ANOREXIA: ICD-10-CM

## 2019-11-21 LAB
CREAT BLD-MCNC: 0.7 MG/DL (ref 0.52–1.04)
GFR SERPL CREATININE-BSD FRML MDRD: 81 ML/MIN/{1.73_M2}

## 2019-11-21 PROCEDURE — 25000125 ZZHC RX 250: Performed by: INTERNAL MEDICINE

## 2019-11-21 PROCEDURE — 82565 ASSAY OF CREATININE: CPT

## 2019-11-21 PROCEDURE — 25000128 H RX IP 250 OP 636: Performed by: INTERNAL MEDICINE

## 2019-11-21 PROCEDURE — 74177 CT ABD & PELVIS W/CONTRAST: CPT

## 2019-11-21 RX ORDER — IOPAMIDOL 755 MG/ML
500 INJECTION, SOLUTION INTRAVASCULAR ONCE
Status: COMPLETED | OUTPATIENT
Start: 2019-11-21 | End: 2019-11-21

## 2019-11-21 RX ADMIN — SODIUM CHLORIDE 48 ML: 9 INJECTION, SOLUTION INTRAVENOUS at 13:21

## 2019-11-21 RX ADMIN — IOPAMIDOL 71 ML: 755 INJECTION, SOLUTION INTRAVENOUS at 13:21

## 2019-12-13 ENCOUNTER — PATIENT OUTREACH (OUTPATIENT)
Dept: PULMONOLOGY | Facility: CLINIC | Age: 74
End: 2019-12-13

## 2019-12-13 DIAGNOSIS — J84.9 ILD (INTERSTITIAL LUNG DISEASE) (H): Primary | ICD-10-CM

## 2019-12-13 RX ORDER — DOXYCYCLINE HYCLATE 100 MG
100 TABLET ORAL 2 TIMES DAILY
Qty: 20 TABLET | Refills: 0 | Status: SHIPPED | OUTPATIENT
Start: 2019-12-13

## 2019-12-13 NOTE — PROGRESS NOTES
Patient called complaining of cough and sputum production which has been getting progressively worse for the past week.  Sputum yellowish brown.  Patient feels slightly more short of breath.  Oxygen saturation 98% at rest on room air and 93% with activity on room air.  Patient also feels like she has low grade fevers.  Discussed with Dr. Olivares who prescribed doxycyline for 10 days. Patient also should take over the counter cough medicine.  Patient informed to call if symptoms get worse.

## 2020-01-16 ENCOUNTER — OFFICE VISIT (OUTPATIENT)
Dept: PULMONOLOGY | Facility: CLINIC | Age: 75
End: 2020-01-16
Attending: INTERNAL MEDICINE
Payer: MEDICARE

## 2020-01-16 ENCOUNTER — ANCILLARY PROCEDURE (OUTPATIENT)
Dept: CT IMAGING | Facility: CLINIC | Age: 75
End: 2020-01-16
Attending: INTERNAL MEDICINE
Payer: MEDICARE

## 2020-01-16 VITALS
WEIGHT: 134 LBS | HEART RATE: 100 BPM | SYSTOLIC BLOOD PRESSURE: 140 MMHG | RESPIRATION RATE: 18 BRPM | HEIGHT: 67 IN | BODY MASS INDEX: 21.03 KG/M2 | OXYGEN SATURATION: 96 % | DIASTOLIC BLOOD PRESSURE: 82 MMHG

## 2020-01-16 DIAGNOSIS — J84.9 ILD (INTERSTITIAL LUNG DISEASE) (H): ICD-10-CM

## 2020-01-16 DIAGNOSIS — J84.9 ILD (INTERSTITIAL LUNG DISEASE) (H): Primary | ICD-10-CM

## 2020-01-16 LAB
ALBUMIN SERPL-MCNC: 3.9 G/DL (ref 3.4–5)
ALP SERPL-CCNC: 133 U/L (ref 40–150)
ALT SERPL W P-5'-P-CCNC: 18 U/L (ref 0–50)
ANION GAP SERPL CALCULATED.3IONS-SCNC: 7 MMOL/L (ref 3–14)
AST SERPL W P-5'-P-CCNC: 20 U/L (ref 0–45)
BASOPHILS # BLD AUTO: 0.1 10E9/L (ref 0–0.2)
BASOPHILS NFR BLD AUTO: 0.9 %
BILIRUB SERPL-MCNC: 0.3 MG/DL (ref 0.2–1.3)
BUN SERPL-MCNC: 12 MG/DL (ref 7–30)
CALCIUM SERPL-MCNC: 9 MG/DL (ref 8.5–10.1)
CHLORIDE SERPL-SCNC: 103 MMOL/L (ref 94–109)
CO2 SERPL-SCNC: 27 MMOL/L (ref 20–32)
CREAT SERPL-MCNC: 0.83 MG/DL (ref 0.52–1.04)
DIFFERENTIAL METHOD BLD: ABNORMAL
EOSINOPHIL # BLD AUTO: 0.1 10E9/L (ref 0–0.7)
EOSINOPHIL NFR BLD AUTO: 1.6 %
ERYTHROCYTE [DISTWIDTH] IN BLOOD BY AUTOMATED COUNT: 13.1 % (ref 10–15)
GFR SERPL CREATININE-BSD FRML MDRD: 68 ML/MIN/{1.73_M2}
GLUCOSE SERPL-MCNC: 100 MG/DL (ref 70–99)
HCT VFR BLD AUTO: 41.2 % (ref 35–47)
HGB BLD-MCNC: 12.8 G/DL (ref 11.7–15.7)
IMM GRANULOCYTES # BLD: 0 10E9/L (ref 0–0.4)
IMM GRANULOCYTES NFR BLD: 0.2 %
LYMPHOCYTES # BLD AUTO: 1.4 10E9/L (ref 0.8–5.3)
LYMPHOCYTES NFR BLD AUTO: 16.4 %
MCH RBC QN AUTO: 29.8 PG (ref 26.5–33)
MCHC RBC AUTO-ENTMCNC: 31.1 G/DL (ref 31.5–36.5)
MCV RBC AUTO: 96 FL (ref 78–100)
MONOCYTES # BLD AUTO: 0.5 10E9/L (ref 0–1.3)
MONOCYTES NFR BLD AUTO: 6.3 %
NEUTROPHILS # BLD AUTO: 6.4 10E9/L (ref 1.6–8.3)
NEUTROPHILS NFR BLD AUTO: 74.6 %
NRBC # BLD AUTO: 0 10*3/UL
NRBC BLD AUTO-RTO: 0 /100
PLATELET # BLD AUTO: 296 10E9/L (ref 150–450)
POTASSIUM SERPL-SCNC: 4.1 MMOL/L (ref 3.4–5.3)
PROT SERPL-MCNC: 7.7 G/DL (ref 6.8–8.8)
RBC # BLD AUTO: 4.3 10E12/L (ref 3.8–5.2)
SODIUM SERPL-SCNC: 137 MMOL/L (ref 133–144)
WBC # BLD AUTO: 8.5 10E9/L (ref 4–11)

## 2020-01-16 PROCEDURE — G0463 HOSPITAL OUTPT CLINIC VISIT: HCPCS | Mod: ZF

## 2020-01-16 PROCEDURE — 36415 COLL VENOUS BLD VENIPUNCTURE: CPT | Performed by: INTERNAL MEDICINE

## 2020-01-16 PROCEDURE — 85025 COMPLETE CBC W/AUTO DIFF WBC: CPT | Performed by: INTERNAL MEDICINE

## 2020-01-16 PROCEDURE — 80053 COMPREHEN METABOLIC PANEL: CPT | Performed by: INTERNAL MEDICINE

## 2020-01-16 ASSESSMENT — MIFFLIN-ST. JEOR: SCORE: 1130.57

## 2020-01-16 ASSESSMENT — PAIN SCALES - GENERAL: PAINLEVEL: NO PAIN (0)

## 2020-01-16 NOTE — LETTER
1/16/2020       RE: Edda Sterling  [address]  [facility] [id number]     Dear Colleague,    Thank you for referring your patient, Edda Sterling, to the [facility] at [facility]. Please see a copy of my visit note below.    [facility]    Reason for Visit  Edda Sterling is a 74 year old year old female who is being seen for Interstitial Lung Disease (ILD) (NSIP )    HPI    Ms. Sterling is a 74-year-old with interstitial lung disease who is here for follow-up.  She has had ILD since at least 2010 and was diagnosed with hypersensitivity pneumonitis in [place].   We reviewed at ILD multidisciplinary conference , and the consensus was that her chest CT scan and prior lung biopsy were consistent with chronic hypersensitivity pneumonitis.  She had a positive ADAM in the past in [place], however most recent ADAM in 2018 was negative, and Dr. Bernal in rheumatology felt she did not have a connective tissue disease.    Today, she reports that she has had a bad cold for 6 weeks, and she has had a hard time getting over it.  We treated her with doxycycline for 10 days in December.  She does not know if the doxycycline made any difference to her symptoms.  She feels as if she has sinus drainage, her head is stuffed, she has a little bit of a sore throat, and she endorses cough, fatigue, and poor sleeping.  She also reports that her breathing feels worse with this bad cold for the past 6 weeks.  She did receive the flu vaccine.  For her chronic hypersensitivity pneumonitis, I started her on treatment with prednisone and mycophenolate in September.  She did prednisone taper over 8 weeks, and her mycophenolate was slowly increased to 1000 mg twice a day.  She has finished the prednisone and does not know if it made her feel better.  She also takes atovaquone for pneumocystis prophylaxis.    She [place] in [facility]  [place], and it would be more convenient for her to follow in the [facility] area.        Current Outpatient Medications   Medication     ADVAIR DISKUS 250-50 MCG/DOSE inhaler     atovaquone (MEPRON) 750 MG/5ML suspension     cholecalciferol (VITAMIN D3) 5000 units TABS tablet     fluticasone (FLONASE) 50 MCG/ACT nasal spray     loratadine (CLARITIN) 10 MG tablet     montelukast (SINGULAIR) 10 MG tablet     mycophenolate (GENERIC EQUIVALENT) 500 MG tablet     NIFEdipine ER OSMOTIC (PROCARDIA XL) 30 MG 24 hr tablet     pantoprazole (PROTONIX) 40 MG EC tablet     PROAIR  (90 Base) MCG/ACT inhaler     st johns wort 300 MG TABS tablet     traZODone (DESYREL) 50 MG tablet     Alum & Mag Hydroxide-Simeth (MYLANTA PO)     docusate sodium (COLACE) 100 MG capsule     doxycycline hyclate (VIBRA-TABS) 100 MG tablet     Naproxen Sodium (ALEVE PO)     No current facility-administered medications for this visit.      Allergies   Allergen Reactions     Nuts      Peas      Seasonal Allergies      Sulfa Drugs      Past Medical History:   Diagnosis Date     ILD (interstitial lung disease) (H)     Surgical lung bx 2010 poss HP, + ADAM 2011 in [place], but repeat neg and no CTD per Rheum Dr. Bernal 2018; ILD clinic 4/2019: CT and lung bx consistent with chronic HP     Pyloric stricture     4 EGDs in 2019       No past surgical history on file.    Social History     Socioeconomic History     Marital status:      Spouse name: Not on file     Number of children: Not on file     Years of education: Not on file     Highest education level: Not on file   Occupational History     Not on file   Social Needs     Financial resource strain: Not on file     Food insecurity:     Worry: Not on file     Inability: Not on file     Transportation needs:     Medical: Not on file     Non-medical: Not on file   Tobacco Use     Smoking status: Never Smoker     Smokeless tobacco: Never Used   Substance and Sexual Activity     Alcohol use: Not  "Currently     Frequency: Never     Drug use: Never     Sexual activity: Not on file   Lifestyle     Physical activity:     Days per week: Not on file     Minutes per session: Not on file     Stress: Not on file   Relationships     Social connections:     Talks on phone: Not on file     Gets together: Not on file     Attends Taoist service: Not on file     Active member of club or organization: Not on file     Attends meetings of clubs or organizations: Not on file     Relationship status: Not on file     Intimate partner violence:     Fear of current or ex partner: Not on file     Emotionally abused: Not on file     Physically abused: Not on file     Forced sexual activity: Not on file   Other Topics Concern     Parent/sibling w/ CABG, MI or angioplasty before 65F 55M? Not Asked   Social History Narrative    Lived in [place] and [place], then moved to [place] 2017.  Retired .  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 Denies exposure to pet birds, hot tubs brass/woodwind instruments.  + exposure to mold in past.       Family History   Problem Relation Age of Onset     LUNG DISEASE No family hx of              ROS Pulmonary  A complete ROS was otherwise negative except as noted in the HPI.    Vitals: BP (!) 140/82 (BP Location: Right arm, Patient Position: Chair, Cuff Size: Adult Regular)   Pulse 100   Resp 18   Ht 1.702 m (5' 7.01\")   Wt 60.8 kg (134 lb)   SpO2 96%   BMI 20.98 kg/m       Exam:   GENERAL APPEARANCE: Well developed, well nourished, slender, alert, and in no apparent distress.  RESP: good air flow throughout.  Bibasilar inspiratory crackles. No rhonchi. No wheezes.  CV: Normal S1, S2, regular rhythm, normal rate. No murmur.  No LE edema.   MS: extremities normal. No clubbing. No cyanosis.  SKIN: no rash on limited exam.  NEURO: Mentation intact, speech normal, normal gait and stance.  PSYCH: mentation appears " normal. and affect normal/bright.    Results:  Recent Results (from the past 168 hour(s))   General PFT Lab (Please always keep checked)    Collection Time: 01/16/20 10:54 AM   Result Value Ref Range    FVC-Pred 2.95 L    FVC-Pre 2.03 L    FVC-%Pred-Pre 68 %    FEV1-Pre 1.40 L    FEV1-%Pred-Pre 62 %    FEV1FVC-Pred 77 %    FEV1FVC-Pre 69 %    FEFMax-Pred 5.63 L/sec    FEFMax-Pre 2.65 L/sec    FEFMax-%Pred-Pre 47 %    FEF2575-Pred 1.78 L/sec    FEF2575-Pre 0.95 L/sec    GBA5560-%Pred-Pre 53 %    ExpTime-Pre 5.60 sec    FIFMax-Pre 4.31 L/sec    VC-Pred 3.32 L    VC-Pre 1.86 L    VC-%Pred-Pre 56 %    IC-Pred 2.39 L    IC-Pre 1.20 L    IC-%Pred-Pre 50 %    ERV-Pred 0.93 L    ERV-Pre 0.66 L    ERV-%Pred-Pre 71 %    FEV1FEV6-Pred 78 %    FEV1FEV6-Pre 69 %    FRCPleth-Pred 2.89 L    FRCPleth-Pre 2.67 L    FRCPleth-%Pred-Pre 92 %    RVPleth-Pred 2.30 L    RVPleth-Pre 2.01 L    RVPleth-%Pred-Pre 87 %    TLCPleth-Pred 5.44 L    TLCPleth-Pre 3.87 L    TLCPleth-%Pred-Pre 71 %    DLCOunc-Pred 21.04 ml/min/mmHg    DLCOunc-Pre 11.44 ml/min/mmHg    DLCOunc-%Pred-Pre 54 %    VA-Pre 3.10 L    VA-%Pred-Pre 58 %    FEV1SVC-Pred 68 %    FEV1SVC-Pre 75 %   Comprehensive metabolic panel    Collection Time: 01/16/20  1:01 PM   Result Value Ref Range    Sodium 137 133 - 144 mmol/L    Potassium 4.1 3.4 - 5.3 mmol/L    Chloride 103 94 - 109 mmol/L    Carbon Dioxide 27 20 - 32 mmol/L    Anion Gap 7 3 - 14 mmol/L    Glucose 100 (H) 70 - 99 mg/dL    Urea Nitrogen 12 7 - 30 mg/dL    Creatinine 0.83 0.52 - 1.04 mg/dL    GFR Estimate 68 >60 mL/min/[1.73_m2]    GFR Estimate If Black 79 >60 mL/min/[1.73_m2]    Calcium 9.0 8.5 - 10.1 mg/dL    Bilirubin Total 0.3 0.2 - 1.3 mg/dL    Albumin 3.9 3.4 - 5.0 g/dL    Protein Total 7.7 6.8 - 8.8 g/dL    Alkaline Phosphatase 133 40 - 150 U/L    ALT 18 0 - 50 U/L    AST 20 0 - 45 U/L   CBC with platelets differential    Collection Time: 01/16/20  1:01 PM   Result Value Ref Range    WBC 8.5 4.0 - 11.0 10e9/L     RBC Count 4.30 3.8 - 5.2 10e12/L    Hemoglobin 12.8 11.7 - 15.7 g/dL    Hematocrit 41.2 35.0 - 47.0 %    MCV 96 78 - 100 fl    MCH 29.8 26.5 - 33.0 pg    MCHC 31.1 (L) 31.5 - 36.5 g/dL    RDW 13.1 10.0 - 15.0 %    Platelet Count 296 150 - 450 10e9/L    Diff Method Automated Method     % Neutrophils 74.6 %    % Lymphocytes 16.4 %    % Monocytes 6.3 %    % Eosinophils 1.6 %    % Basophils 0.9 %    % Immature Granulocytes 0.2 %    Nucleated RBCs 0 0 /100    Absolute Neutrophil 6.4 1.6 - 8.3 10e9/L    Absolute Lymphocytes 1.4 0.8 - 5.3 10e9/L    Absolute Monocytes 0.5 0.0 - 1.3 10e9/L    Absolute Eosinophils 0.1 0.0 - 0.7 10e9/L    Absolute Basophils 0.1 0.0 - 0.2 10e9/L    Abs Immature Granulocytes 0.0 0 - 0.4 10e9/L    Absolute Nucleated RBC 0.0          FVC FEV1 TLC DLCO   4/2014 () 2.32L 76%         9/2015 () 2.35L 71%         4/2017 () 2.3L 71% 1.8L 74%       9/2017 () 2.3L 77% 1.9L 83%   11.9 56%   12/2018 ([facility]) 2.17L 68% 1.73L 72%   11.1 57%   4/2019 [facility] 2.00L 67% 1.72L 76% 3.58L 65% 12.81 60%   9-6-2019 2.11 71% 1.84 81% 3.77 69% 13.00 61%   1- 2.03 68% 1.40 62% 3.87 71% 11.44 54%       I reviewed pulmonary function test that was performed today.  This shows moderate restriction with a moderate diffusion defect.  There has been a drop in FEV1 compared to last visit.  However, testing did not meet ATS criteria so the results might not be accurate.  Also reviewed labs today, and they are within normal limits.    I reviewed results with the patient.      Assessment and plan:     Ms. Sterling is a 74-year-old with chronic hypersensitivity pneumonitis who is here for follow-up.    1.  ILD.  She was treated with prednisone zone taper for over 8 weeks, but she does not know if she felt any better on it.  PFT today shows stability other than drop in FEV1, however testing did not meet ATS criteria so results might not be accurate.  She has had a bad cold for 6 weeks or longer and has felt pretty  miserable.  I will get a chest CT scan for further evaluation.  One option would be to stop the mycophenolate to help her recover from her viral URI.  She does not want to be on prednisone long-term, which is why I treated her with prednisone for only 8 weeks.  If my colleague starts an out reach clinic in [place], then she could follow-up there as it is more convenient for her.  Otherwise she will return in 3 months with full PFT.    2.  High risk medication monitoring.  Labs today are normal.  Labs will be checked again in 3 months to monitor mycophenolate.  She currently takes atovaquone for pneumocystis prophylaxis.    Addendum: I reviewed high-resolution chest CT scan.  I see no evidence of pneumonia or cancer or infectious process.  Her ILD looks similar to what it did last year.  I will have her hold the mycophenolate for 2 weeks, and she will contact my nurse to let us know how she is doing.  If she is doing well, we could potentially restart the mycophenolate but at low dose.      Sincerely,    Kitty Olivares MD

## 2020-01-16 NOTE — NURSING NOTE
Chief Complaint   Patient presents with     Interstitial Lung Disease (ILD)     NSIP      Medications reviewed and updated.  Vitals taken  Paulina Rodriguez CMA

## 2020-01-16 NOTE — PROGRESS NOTES
[facility]    Reason for Visit  Edda Sterling is a 74 year old year old female who is being seen for Interstitial Lung Disease (ILD) (NSIP )    HPI    Ms. Sterling is a 74-year-old with interstitial lung disease who is here for follow-up.  She has had ILD since at least 2010 and was diagnosed with hypersensitivity pneumonitis in [place].   We reviewed at ILD multidisciplinary conference , and the consensus was that her chest CT scan and prior lung biopsy were consistent with chronic hypersensitivity pneumonitis.  She had a positive ADAM in the past in [place], however most recent ADAM in 2018 was negative, and Dr. Bernal in rheumatology felt she did not have a connective tissue disease.    Today, she reports that she has had a bad cold for 6 weeks, and she has had a hard time getting over it.  We treated her with doxycycline for 10 days in December.  She does not know if the doxycycline made any difference to her symptoms.  She feels as if she has sinus drainage, her head is stuffed, she has a little bit of a sore throat, and she endorses cough, fatigue, and poor sleeping.  She also reports that her breathing feels worse with this bad cold for the past 6 weeks.  She did receive the flu vaccine.  For her chronic hypersensitivity pneumonitis, I started her on treatment with prednisone and mycophenolate in September.  She did prednisone taper over 8 weeks, and her mycophenolate was slowly increased to 1000 mg twice a day.  She has finished the prednisone and does not know if it made her feel better.  She also takes atovaquone for pneumocystis prophylaxis.    She lives in [place], and it would be more convenient for her to follow in the [facility] area.        Current Outpatient Medications   Medication     ADVAIR DISKUS 250-50 MCG/DOSE inhaler     atovaquone (MEPRON) 750 MG/5ML suspension     cholecalciferol (VITAMIN D3) 5000 units TABS tablet     fluticasone (FLONASE)  50 MCG/ACT nasal spray     loratadine (CLARITIN) 10 MG tablet     montelukast (SINGULAIR) 10 MG tablet     mycophenolate (GENERIC EQUIVALENT) 500 MG tablet     NIFEdipine ER OSMOTIC (PROCARDIA XL) 30 MG 24 hr tablet     pantoprazole (PROTONIX) 40 MG EC tablet     PROAIR  (90 Base) MCG/ACT inhaler     st johns wort 300 MG TABS tablet     traZODone (DESYREL) 50 MG tablet     Alum & Mag Hydroxide-Simeth (MYLANTA PO)     docusate sodium (COLACE) 100 MG capsule     doxycycline hyclate (VIBRA-TABS) 100 MG tablet     Naproxen Sodium (ALEVE PO)     No current facility-administered medications for this visit.      Allergies   Allergen Reactions     Nuts      Peas      Seasonal Allergies      Sulfa Drugs      Past Medical History:   Diagnosis Date     ILD (interstitial lung disease) (H)     Surgical lung bx 2010 poss HP, + ADAM 2011 in [place], but repeat neg and no CTD per Rheum Dr. Bernal 2018; ILD clinic 4/2019: CT and lung bx consistent with chronic HP     Pyloric stricture     4 EGDs in 2019       No past surgical history on file.    Social History     Socioeconomic History     Marital status:      Spouse name: Not on file     Number of children: Not on file     Years of education: Not on file     Highest education level: Not on file   Occupational History     Not on file   Social Needs     Financial resource strain: Not on file     Food insecurity:     Worry: Not on file     Inability: Not on file     Transportation needs:     Medical: Not on file     Non-medical: Not on file   Tobacco Use     Smoking status: Never Smoker     Smokeless tobacco: Never Used   Substance and Sexual Activity     Alcohol use: Not Currently     Frequency: Never     Drug use: Never     Sexual activity: Not on file   Lifestyle     Physical activity:     Days per week: Not on file     Minutes per session: Not on file     Stress: Not on file   Relationships     Social connections:     Talks on phone: Not on file     Gets together:  "Not on file     Attends Uats[employer] service: Not on file     Active member of club or organization: Not on file     Attends meetings of clubs or organizations: Not on file     Relationship status: Not on file     Intimate partner violence:     Fear of current or ex partner: Not on file     Emotionally abused: Not on file     Physically abused: Not on file     Forced sexual activity: Not on file   Other Topics Concern     Parent/sibling w/ CABG, MI or angioplasty before 65F 55M? Not Asked   Social History Narrative    Lived in [place] and [place], then moved to [place] 2017.  Retired .  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 Denies exposure to pet birds, hot tubs brass/woodwind instruments.  + exposure to mold in past.       Family History   Problem Relation Age of Onset     LUNG DISEASE No family hx of              ROS Pulmonary  A complete ROS was otherwise negative except as noted in the HPI.    Vitals: BP (!) 140/82 (BP Location: Right arm, Patient Position: Chair, Cuff Size: Adult Regular)   Pulse 100   Resp 18   Ht 1.702 m (5' 7.01\")   Wt 60.8 kg (134 lb)   SpO2 96%   BMI 20.98 kg/m       Exam:   GENERAL APPEARANCE: Well developed, well nourished, slender, alert, and in no apparent distress.  RESP: good air flow throughout.  Bibasilar inspiratory crackles. No rhonchi. No wheezes.  CV: Normal S1, S2, regular rhythm, normal rate. No murmur.  No LE edema.   MS: extremities normal. No clubbing. No cyanosis.  SKIN: no rash on limited exam.  NEURO: Mentation intact, speech normal, normal gait and stance.  PSYCH: mentation appears normal. and affect normal/bright.    Results:  Recent Results (from the past 168 hour(s))   General PFT Lab (Please always keep checked)    Collection Time: 01/16/20 10:54 AM   Result Value Ref Range    FVC-Pred 2.95 L    FVC-Pre 2.03 L    FVC-%Pred-Pre 68 %    FEV1-Pre 1.40 L    FEV1-%Pred-Pre 62 % "    FEV1FVC-Pred 77 %    FEV1FVC-Pre 69 %    FEFMax-Pred 5.63 L/sec    FEFMax-Pre 2.65 L/sec    FEFMax-%Pred-Pre 47 %    FEF2575-Pred 1.78 L/sec    FEF2575-Pre 0.95 L/sec    PGT7641-%Pred-Pre 53 %    ExpTime-Pre 5.60 sec    FIFMax-Pre 4.31 L/sec    VC-Pred 3.32 L    VC-Pre 1.86 L    VC-%Pred-Pre 56 %    IC-Pred 2.39 L    IC-Pre 1.20 L    IC-%Pred-Pre 50 %    ERV-Pred 0.93 L    ERV-Pre 0.66 L    ERV-%Pred-Pre 71 %    FEV1FEV6-Pred 78 %    FEV1FEV6-Pre 69 %    FRCPleth-Pred 2.89 L    FRCPleth-Pre 2.67 L    FRCPleth-%Pred-Pre 92 %    RVPleth-Pred 2.30 L    RVPleth-Pre 2.01 L    RVPleth-%Pred-Pre 87 %    TLCPleth-Pred 5.44 L    TLCPleth-Pre 3.87 L    TLCPleth-%Pred-Pre 71 %    DLCOunc-Pred 21.04 ml/min/mmHg    DLCOunc-Pre 11.44 ml/min/mmHg    DLCOunc-%Pred-Pre 54 %    VA-Pre 3.10 L    VA-%Pred-Pre 58 %    FEV1SVC-Pred 68 %    FEV1SVC-Pre 75 %   Comprehensive metabolic panel    Collection Time: 01/16/20  1:01 PM   Result Value Ref Range    Sodium 137 133 - 144 mmol/L    Potassium 4.1 3.4 - 5.3 mmol/L    Chloride 103 94 - 109 mmol/L    Carbon Dioxide 27 20 - 32 mmol/L    Anion Gap 7 3 - 14 mmol/L    Glucose 100 (H) 70 - 99 mg/dL    Urea Nitrogen 12 7 - 30 mg/dL    Creatinine 0.83 0.52 - 1.04 mg/dL    GFR Estimate 68 >60 mL/min/[1.73_m2]    GFR Estimate If Black 79 >60 mL/min/[1.73_m2]    Calcium 9.0 8.5 - 10.1 mg/dL    Bilirubin Total 0.3 0.2 - 1.3 mg/dL    Albumin 3.9 3.4 - 5.0 g/dL    Protein Total 7.7 6.8 - 8.8 g/dL    Alkaline Phosphatase 133 40 - 150 U/L    ALT 18 0 - 50 U/L    AST 20 0 - 45 U/L   CBC with platelets differential    Collection Time: 01/16/20  1:01 PM   Result Value Ref Range    WBC 8.5 4.0 - 11.0 10e9/L    RBC Count 4.30 3.8 - 5.2 10e12/L    Hemoglobin 12.8 11.7 - 15.7 g/dL    Hematocrit 41.2 35.0 - 47.0 %    MCV 96 78 - 100 fl    MCH 29.8 26.5 - 33.0 pg    MCHC 31.1 (L) 31.5 - 36.5 g/dL    RDW 13.1 10.0 - 15.0 %    Platelet Count 296 150 - 450 10e9/L    Diff Method Automated Method     % Neutrophils  74.6 %    % Lymphocytes 16.4 %    % Monocytes 6.3 %    % Eosinophils 1.6 %    % Basophils 0.9 %    % Immature Granulocytes 0.2 %    Nucleated RBCs 0 0 /100    Absolute Neutrophil 6.4 1.6 - 8.3 10e9/L    Absolute Lymphocytes 1.4 0.8 - 5.3 10e9/L    Absolute Monocytes 0.5 0.0 - 1.3 10e9/L    Absolute Eosinophils 0.1 0.0 - 0.7 10e9/L    Absolute Basophils 0.1 0.0 - 0.2 10e9/L    Abs Immature Granulocytes 0.0 0 - 0.4 10e9/L    Absolute Nucleated RBC 0.0          FVC FEV1 TLC DLCO   4/2014 () 2.32L 76%         9/2015 () 2.35L 71%         4/2017 () 2.3L 71% 1.8L 74%       9/2017 () 2.3L 77% 1.9L 83%   11.9 56%   12/2018 ([facility]) 2.17L 68% 1.73L 72%   11.1 57%   4/2019 [facility] 2.00L 67% 1.72L 76% 3.58L 65% 12.81 60%   9-6-2019 2.11 71% 1.84 81% 3.77 69% 13.00 61%   1- 2.03 68% 1.40 62% 3.87 71% 11.44 54%       I reviewed pulmonary function test that was performed today.  This shows moderate restriction with a moderate diffusion defect.  There has been a drop in FEV1 compared to last visit.  However, testing did not meet ATS criteria so the results might not be accurate.  Also reviewed labs today, and they are within normal limits.    I reviewed results with the patient.      Assessment and plan:     Ms. Sterling is a 74-year-old with chronic hypersensitivity pneumonitis who is here for follow-up.    1.  ILD.  She was treated with prednisone zone taper for over 8 weeks, but she does not know if she felt any better on it.  PFT today shows stability other than drop in FEV1, however testing did not meet ATS criteria so results might not be accurate.  She has had a bad cold for 6 weeks or longer and has felt pretty miserable.  I will get a chest CT scan for further evaluation.  One option would be to stop the mycophenolate to help her recover from her viral URI.  She does not want to be on prednisone long-term, which is why I treated her with prednisone for only 8 weeks.  If my colleague starts an out reach clinic in  [place], then she could follow-up there as it is more convenient for her.  Otherwise she will return in 3 months with full PFT.    2.  High risk medication monitoring.  Labs today are normal.  Labs will be checked again in 3 months to monitor mycophenolate.  She currently takes atovaquone for pneumocystis prophylaxis.    Addendum: I reviewed high-resolution chest CT scan.  I see no evidence of pneumonia or cancer or infectious process.  Her ILD looks similar to what it did last year.  I will have her hold the mycophenolate for 2 weeks, and she will contact my nurse to let us know how she is doing.  If she is doing well, we could potentially restart the mycophenolate but at low dose.

## 2020-01-18 LAB
DLCOUNC-%PRED-PRE: 54 %
DLCOUNC-PRE: 11.44 ML/MIN/MMHG
DLCOUNC-PRED: 21.04 ML/MIN/MMHG
ERV-%PRED-PRE: 71 %
ERV-PRE: 0.66 L
ERV-PRED: 0.93 L
EXPTIME-PRE: 5.6 SEC
FEF2575-%PRED-PRE: 53 %
FEF2575-PRE: 0.95 L/SEC
FEF2575-PRED: 1.78 L/SEC
FEFMAX-%PRED-PRE: 47 %
FEFMAX-PRE: 2.65 L/SEC
FEFMAX-PRED: 5.63 L/SEC
FEV1-%PRED-PRE: 62 %
FEV1-PRE: 1.4 L
FEV1FEV6-PRE: 69 %
FEV1FEV6-PRED: 78 %
FEV1FVC-PRE: 69 %
FEV1FVC-PRED: 77 %
FEV1SVC-PRE: 75 %
FEV1SVC-PRED: 68 %
FIFMAX-PRE: 4.31 L/SEC
FRCPLETH-%PRED-PRE: 92 %
FRCPLETH-PRE: 2.67 L
FRCPLETH-PRED: 2.89 L
FVC-%PRED-PRE: 68 %
FVC-PRE: 2.03 L
FVC-PRED: 2.95 L
IC-%PRED-PRE: 50 %
IC-PRE: 1.2 L
IC-PRED: 2.39 L
RVPLETH-%PRED-PRE: 87 %
RVPLETH-PRE: 2.01 L
RVPLETH-PRED: 2.3 L
TLCPLETH-%PRED-PRE: 71 %
TLCPLETH-PRE: 3.87 L
TLCPLETH-PRED: 5.44 L
VA-%PRED-PRE: 58 %
VA-PRE: 3.1 L
VC-%PRED-PRE: 56 %
VC-PRE: 1.86 L
VC-PRED: 3.32 L

## 2020-01-31 ENCOUNTER — PATIENT OUTREACH (OUTPATIENT)
Dept: PULMONOLOGY | Facility: CLINIC | Age: 75
End: 2020-01-31

## 2020-01-31 NOTE — PROGRESS NOTES
Patient called stating that she is feeling better and her cold type symptoms have improved.  She has been off of Cellcept for two weeks and is wondering if she can resume.  Discussed with Dr. Olivares who advises patient to restart Cellcept.  Patient is in agreement.

## 2020-04-16 ENCOUNTER — VIRTUAL VISIT (OUTPATIENT)
Dept: PULMONOLOGY | Facility: CLINIC | Age: 75
End: 2020-04-16
Attending: INTERNAL MEDICINE
Payer: MEDICARE

## 2020-04-16 DIAGNOSIS — J84.9 ILD (INTERSTITIAL LUNG DISEASE) (H): ICD-10-CM

## 2020-04-16 DIAGNOSIS — J30.2 SEASONAL ALLERGIES: Primary | ICD-10-CM

## 2020-04-16 RX ORDER — MONTELUKAST SODIUM 10 MG/1
10 TABLET ORAL EVERY EVENING
Qty: 90 TABLET | Refills: 3 | Status: SHIPPED | OUTPATIENT
Start: 2020-04-16

## 2020-04-16 RX ORDER — MONTELUKAST SODIUM 10 MG/1
TABLET ORAL
Refills: 2 | Status: CANCELLED | OUTPATIENT
Start: 2020-04-16

## 2020-04-16 NOTE — PROGRESS NOTES
"Edda Sterling is a 75 year old female who is being evaluated via a billable telephone visit.      The patient has been notified of following:     \"This telephone visit will be conducted via a call between you and your physician/provider. We have found that certain health care needs can be provided without the need for a physical exam.  This service lets us provide the care you need with a short phone conversation.  If a prescription is necessary we can send it directly to your pharmacy.  If lab work is needed we can place an order for that and you can then stop by our lab to have the test done at a later time.    Telephone visits are billed at different rates depending on your insurance coverage. During this emergency period, for some insurers they may be billed the same as an in-person visit.  Please reach out to your insurance provider with any questions.    If during the course of the call the physician/provider feels a telephone visit is not appropriate, you will not be charged for this service.\"    Patient has given verbal consent for Telephone visit?  Yes      Additional provider notes:  Ms. Sterling is a 75-year-old with chronic hypersensitivity pneumonitis with whom I had a phone visit today.  She has had ILD since at least 2010 and was diagnosed with hypersensitivity pneumonitis in [place].   We reviewed at ILD multidisciplinary conference , and the consensus was that her chest CT scan and prior lung biopsy were consistent with chronic hypersensitivity pneumonitis.  She had a positive ADAM in the past in [place], however most recent ADAM in 2018 was negative, and Dr. Bernal in rheumatology felt she did not have a connective tissue disease.  She started mycophenolate in September 2019 with prednisone.  Prednisone was tapered off in 8 weeks.  Today, she reports that she is having some issues which she attributes to mycophenolate, which she takes 1000 mg twice a day.  She sleeps poorly 5-1/2 to 6 hours a night " "and feels tired; she also reports vague headache with occasional feeling of lightheadedness, anxiety, and overall feeling \"unsettled\".   In hindsight, she thinks that the best she felt was at the end of her prednisone taper, and she is wondering if she should restart prednisone.  She reports that her dyspnea on exertion feels about the same.  She denies a cough or fevers although her temperature is higher than it usually is at 98.9 degrees.  She also needs a refill for her montelukast which she has taken for 7 or 8 years, and she thinks she started for allergies.      FVC FEV1 TLC DLCO   4/2014 () 2.32L 76%         9/2015 () 2.35L 71%         4/2017 () 2.3L 71% 1.8L 74%       9/2017 ([facility]) 2.3L 77% 1.9L 83%   11.9 56%   12/2018 ([facility]) 2.17L 68% 1.73L 72%   11.1 57%   4/2019 [facility] 2.00L 67% 1.72L 76% 3.58L 65% 12.81 60%   9-6-2019 2.11 71% 1.84 81% 3.77 69% 13.00 61%   1- 2.03 68% 1.40 62% 3.87 71% 11.44 54%       Assessment and plan:  Ms. Sterling is a 75-year-old with chronic hypersensitivity pneumonitis with whom I had a phone visit today.    1.  Chronic hypersensitivity pneumonitis.  Symptomatically, her dyspnea appears to be stable.  There is no PFT today because today's visit was a phone visit.  She attributes side effects to mycophenolate that are vague, including possible headache, anxiety, lightheadedness, and poor sleep.  I think it is reasonable to have her stop mycophenolate for 1 to 2 weeks and see if she feels better.  If her symptoms do not improve, then it is not the mycophenolate and we would restart 1000 mg twice a day.  If her symptoms improve, then we could restart but add low-dose prednisone.  She is considering going back on prednisone, as in hindsight she felt the best when she was finishing her prednisone taper.  We could consider adding prednisone 10 mg daily.  I have encouraged her to start regular physical activity such as walking, now that the weather is warming up.  I would like " to see her back in 3 months with full PFT.    2.  High risk medication monitoring.  She needs labs to monitor mycophenolate, and I will have my nurse set up lab tests at her primary clinic which is more convenient for her.  I will also recheck labs in 3 months to monitor mycophenolate.    I gave her a new prescription for montelukast 10 mg daily.    Phone call duration: 21 minutes

## 2020-04-17 ENCOUNTER — TELEPHONE (OUTPATIENT)
Dept: PULMONOLOGY | Facility: CLINIC | Age: 75
End: 2020-04-17

## 2020-04-17 DIAGNOSIS — J84.9 ILD (INTERSTITIAL LUNG DISEASE) (H): Primary | ICD-10-CM

## 2020-04-17 DIAGNOSIS — T78.40XA ALLERGIES: ICD-10-CM

## 2020-04-17 RX ORDER — FLUTICASONE PROPIONATE 50 MCG
1 SPRAY, SUSPENSION (ML) NASAL DAILY
Qty: 15.8 ML | Refills: 4 | Status: SHIPPED | OUTPATIENT
Start: 2020-04-17

## 2020-04-23 DIAGNOSIS — J84.9 ILD (INTERSTITIAL LUNG DISEASE) (H): Primary | ICD-10-CM

## 2020-04-27 ENCOUNTER — TRANSFERRED RECORDS (OUTPATIENT)
Dept: HEALTH INFORMATION MANAGEMENT | Facility: CLINIC | Age: 75
End: 2020-04-27

## 2020-05-04 ENCOUNTER — PATIENT OUTREACH (OUTPATIENT)
Dept: PULMONOLOGY | Facility: CLINIC | Age: 75
End: 2020-05-04

## 2020-05-04 DIAGNOSIS — J84.9 ILD (INTERSTITIAL LUNG DISEASE) (H): ICD-10-CM

## 2020-05-04 RX ORDER — PREDNISONE 10 MG/1
10 TABLET ORAL DAILY
Qty: 30 TABLET | Refills: 1 | Status: SHIPPED | OUTPATIENT
Start: 2020-05-04 | End: 2020-06-09

## 2020-05-04 NOTE — PROGRESS NOTES
"Patient contacted back to update Dr Olivares after holding Mycophenolate for the past few weeks due to possible side effects. Patient stated that \"my feelings of being lightheaded and anxious have subsided, sleeping is only marginally improved and my temperature continues to be in the very low grade range\". Notified Dr Olivares. Plan to restart Mycophenolate at 500 mg bid and add prednisone 10 mg daily.  Stay on 500 bid for a month and call us after a month. Informed patient. Agreed with plan.   "

## 2020-06-02 ENCOUNTER — PATIENT OUTREACH (OUTPATIENT)
Dept: PULMONOLOGY | Facility: CLINIC | Age: 75
End: 2020-06-02

## 2020-06-02 DIAGNOSIS — J84.9 ILD (INTERSTITIAL LUNG DISEASE) (H): Primary | ICD-10-CM

## 2020-06-02 NOTE — PROGRESS NOTES
Contacted patient to see how she was doing after making medication adjustment about a month agoe, reduced Cellcept to 500 mg BID and added Prednisone 10 mg. Patient stated she was feeling okay, having some sleeping issues, but better then she was. Would like to continue with current therapy until follow up. Plan will be to get Mycophenolate level to see if she is getting therapeutic levels of Cellcept.

## 2020-06-09 DIAGNOSIS — J84.9 ILD (INTERSTITIAL LUNG DISEASE) (H): ICD-10-CM

## 2020-06-09 RX ORDER — PREDNISONE 10 MG/1
TABLET ORAL
Qty: 90 TABLET | Refills: 3 | Status: SHIPPED | OUTPATIENT
Start: 2020-06-09

## 2020-07-29 ENCOUNTER — TRANSFERRED RECORDS (OUTPATIENT)
Dept: HEALTH INFORMATION MANAGEMENT | Facility: CLINIC | Age: 75
End: 2020-07-29

## 2020-07-30 ENCOUNTER — VIRTUAL VISIT (OUTPATIENT)
Dept: PULMONOLOGY | Facility: CLINIC | Age: 75
End: 2020-07-30
Attending: INTERNAL MEDICINE
Payer: MEDICARE

## 2020-07-30 DIAGNOSIS — J84.9 ILD (INTERSTITIAL LUNG DISEASE) (H): Primary | ICD-10-CM

## 2020-07-30 NOTE — LETTER
7/30/2020         RE: Edda Sterling  [address]  [place] [id number]        Dear Colleague,    Thank you for referring your patient, Edda Sterling, to the [facility]. Please see a copy of my visit note below.    Edda Sterling is a 75 year old female who is being evaluated via a billable telephone visit.        Phone call [email or web address] minutes      HPI  Ms. Sterling is a 75-year-old with chronic hypersensitivity pneumonitis with whom I had a phone visit today.  She has had ILD since at least 2010 and was diagnosed with hypersensitivity pneumonitis in [place].   We reviewed at ILD multidisciplinary conference , and the consensus was that her chest CT scan and prior lung biopsy were consistent with chronic hypersensitivity pneumonitis.  She had a positive ADAM in the past in [place], however most recent ADAM in 2018 was negative, and Dr. Bernal in rheumatology felt she did not have a connective tissue disease.  She started mycophenolate in September 2019 with prednisone.  Prednisone was tapered off in 8 weeks.  She developed side effects from mycophenolate including headaches, sleep problems, anxiety, and lightheadedness.  We stopped the mycophenolate and she felt a little better, so we restarted at half dose 500 mg twice a day in May.  We also added prednisone 10 mg daily at that time because she felt that her breathing was better when she was on prednisone.    Today, she reports that her breathing feels the same.  She has some bad days and good days.  Her breathing usually feels worse with heat and humidity.  She denies cough, fevers, new skin rashes, diarrhea, nausea, or vomiting.  She reports that the half dose mycophenolate is working well for her, and she no longer feels anxious, lightheaded, or headaches.  She continues to have some sleep problems.  She does live alone and does practice social distancing.  She had labs drawn at [facility] in [place]  yesterday.      Results:      FVC FEV1 TLC DLCO   4/2014 () 2.32L 76%         9/2015 () 2.35L 71%         4/2017 ([facility]) 2.3L 71% 1.8L 74%       9/2017 ([facility]) 2.3L 77% 1.9L 83%   11.9 56%   12/2018 ([facility]) 2.17L 68% 1.73L 72%   11.1 57%   4/2019 [facility] 2.00L 67% 1.72L 76% 3.58L 65% 12.81 60%   9-6-2019 2.11 71% 1.84 81% 3.77 69% 13.00 61%   1- 2.03 68% 1.40 62% 3.87 71% 11.44 54%   4- Virtual visit      7- Virtual visit            Assessment and plan:  Ms. Sterling is a 75-year-old with chronic hypersensitivity pneumonitis with whom I had a phone visit today.      1.  Chronic hypersensitivity pneumonitis.  Symptomatically, she feels that her breathing is stable.  There is no PFT for me to review today as today is a telephone visit.  I encouraged her to continue to practice social distancing.  We will continue mycophenolate 500 mg twice a day and prednisone 10 mg daily.  I would like to see her in 4 months.  She will come to clinic with a PFT at that time.  If she does not feel comfortable coming to clinic, then she will change it to a telephone visit.  I recommended that she get a flu vaccine this fall.    2.  High risk medication monitoring.  We will get the lab results from [facility] yesterday.  We will let her know the results.  She will continue atovaquone for pneumocystis prophylaxis.  I will recheck labs in 4 months when she comes to clinic.      Again, thank you for allowing me to participate in the care of your patient.        Sincerely,        Kitty Olivares MD

## 2020-07-30 NOTE — PROGRESS NOTES
"Edda Sterling is a 75 year old female who is being evaluated via a billable telephone visit.      The patient has been notified of following:     \"This telephone visit will be conducted via a call between you and your physician/provider. We have found that certain health care needs can be provided without the need for a physical exam.  This service lets us provide the care you need with a short phone conversation.  If a prescription is necessary we can send it directly to your pharmacy.  If lab work is needed we can place an order for that and you can then stop by our lab to have the test done at a later time.    Telephone visits are billed at different rates depending on your insurance coverage. During this emergency period, for some insurers they may be billed the same as an in-person visit.  Please reach out to your insurance provider with any questions.    If during the course of the call the physician/provider feels a telephone visit is not appropriate, you will not be charged for this service.\"    Patient has given verbal consent for Telephone visit?  Yes    What phone number would you like to be contacted at? [phone]    How would you like to obtain your AVS? Mail a copy    Phone call [email or web address] minutes      HPI  Ms. Sterling is a 75-year-old with chronic hypersensitivity pneumonitis with whom I had a phone visit today.  She has had ILD since at least 2010 and was diagnosed with hypersensitivity pneumonitis in [place].   We reviewed at ILD multidisciplinary conference , and the consensus was that her chest CT scan and prior lung biopsy were consistent with chronic hypersensitivity pneumonitis.  She had a positive ADAM in the past in [place], however most recent ADAM in 2018 was negative, and Dr. Bernal in rheumatology felt she did not have a connective tissue disease.  She started mycophenolate in September 2019 with prednisone.  Prednisone was tapered off in 8 weeks.  She developed side effects from " mycophenolate including headaches, sleep problems, anxiety, and lightheadedness.  We stopped the mycophenolate and she felt a little better, so we restarted at half dose 500 mg twice a day in May.  We also added prednisone 10 mg daily at that time because she felt that her breathing was better when she was on prednisone.    Today, she reports that her breathing feels the same.  She has some bad days and good days.  Her breathing usually feels worse with heat and humidity.  She denies cough, fevers, new skin rashes, diarrhea, nausea, or vomiting.  She reports that the half dose mycophenolate is working well for her, and she no longer feels anxious, lightheaded, or headaches.  She continues to have some sleep problems.  She does live alone and does practice social distancing.  She had labs drawn at [facility] in [place] yesterday.      Results:      FVC FEV1 TLC DLCO   4/2014 () 2.32L 76%         9/2015 () 2.35L 71%         4/2017 () 2.3L 71% 1.8L 74%       9/2017 () 2.3L 77% 1.9L 83%   11.9 56%   12/2018 ([facility]) 2.17L 68% 1.73L 72%   11.1 57%   4/2019 [facility] 2.00L 67% 1.72L 76% 3.58L 65% 12.81 60%   9-6-2019 2.11 71% 1.84 81% 3.77 69% 13.00 61%   1- 2.03 68% 1.40 62% 3.87 71% 11.44 54%   4- Virtual visit      7- Virtual visit            Assessment and plan:  Ms. Sterling is a 75-year-old with chronic hypersensitivity pneumonitis with whom I had a phone visit today.      1.  Chronic hypersensitivity pneumonitis.  Symptomatically, she feels that her breathing is stable.  There is no PFT for me to review today as today is a telephone visit.  I encouraged her to continue to practice social distancing.  We will continue mycophenolate 500 mg twice a day and prednisone 10 mg daily.  I would like to see her in 4 months.  She will come to clinic with a PFT at that time.  If she does not feel comfortable coming to clinic, then she will change it to a telephone visit.  I recommended that she  get a flu vaccine this fall.    2.  High risk medication monitoring.  We will get the lab results from [facility] yesterday.  We will let her know the results.  She will continue atovaquone for pneumocystis prophylaxis.  I will recheck labs in 4 months when she comes to clinic.

## 2020-08-05 ENCOUNTER — EXTERNAL ORDER RESULTS (OUTPATIENT)
Facility: CLINIC | Age: 75
End: 2020-08-05

## 2020-08-05 LAB
ALBUMIN SERPL-MCNC: 4.1 G/DL
ALBUMIN/GLOBULIN RATIO - QUEST: 1.7
ALP SERPL-CCNC: 85 U/L
ALT SERPL-CCNC: 10 U/L
AST SERPL-CCNC: 21 U/L
BILIRUB SERPL-MCNC: 0.6 MG/DL
BUN SERPL-MCNC: 15 MG/DL
BUN/CREATININE RATIO: ABNORMAL
CALCIUM SERPL-MCNC: 9.5 MG/DL
CHLORIDE SERPLBLD-SCNC: 97 MMOL/L
CO2 SERPL-SCNC: 29 MMOL/L
CREAT SERPL-MCNC: 0.9 MG/DL
GFR SERPL CREATININE-BSD FRML MDRD: 62 ML/MIN/1.73M2
GLOBULIN: 2.4 G/DL
GLUCOSE SERPL-MCNC: 102 MG/DL (ref 70–99)
MPA GLUCURONIDE: 40.1 MCG/ML
MYCOPHENOLIC ACID: 3.2 MCG/ML
POTASSIUM SERPL-SCNC: 4.3 MMOL/L
PROT SERPL-MCNC: 6.5 G/DL
SODIUM SERPL-SCNC: 136 MMOL/L

## 2020-09-28 DIAGNOSIS — J84.9 ILD (INTERSTITIAL LUNG DISEASE) (H): ICD-10-CM

## 2020-09-28 RX ORDER — ATOVAQUONE 750 MG/5ML
1500 SUSPENSION ORAL DAILY
Qty: 300 ML | Refills: 11 | Status: SHIPPED | OUTPATIENT
Start: 2020-09-28

## 2020-10-08 ENCOUNTER — PATIENT OUTREACH (OUTPATIENT)
Dept: PULMONOLOGY | Facility: CLINIC | Age: 75
End: 2020-10-08

## 2020-10-08 NOTE — PROGRESS NOTES
Patient contacted regarding not feeling well since Sunday. Symptoms include fatigue, exhaustion, low grade fever , oxygen levels dropping to the lower 90's with activity and not on oxygen. No cough. Denies any exposure to COVID. Discussed that her symptoms sound viral, to treat with rest and fluids, if worsens she should seek care and get test for COVID and other things. Told her contact back if she starts coughing up any colored sputum. Instructed okay to take over the counter medications to help with symptoms.  Patient agreed with plan.